# Patient Record
Sex: FEMALE | Race: WHITE | NOT HISPANIC OR LATINO | Employment: FULL TIME | URBAN - METROPOLITAN AREA
[De-identification: names, ages, dates, MRNs, and addresses within clinical notes are randomized per-mention and may not be internally consistent; named-entity substitution may affect disease eponyms.]

---

## 2012-10-18 LAB — EXTERNAL HIV SCREEN: NORMAL

## 2017-01-06 ENCOUNTER — GENERIC CONVERSION - ENCOUNTER (OUTPATIENT)
Dept: OTHER | Facility: OTHER | Age: 42
End: 2017-01-06

## 2017-01-31 ENCOUNTER — ALLSCRIPTS OFFICE VISIT (OUTPATIENT)
Dept: OTHER | Facility: OTHER | Age: 42
End: 2017-01-31

## 2017-02-03 LAB
DEPRECATED RDW RBC AUTO: 13.8 % (ref 11–15)
HCT VFR BLD AUTO: 42.6 % (ref 35–45)
HGB BLD-MCNC: 14.1 G/DL (ref 11.7–15.5)
LYME IGG/IGM AB (HISTORICAL): NORMAL INDEX
MCH RBC QN AUTO: 30 PG (ref 27–33)
MCHC RBC AUTO-ENTMCNC: 33.2 G/DL (ref 32–36)
MCV RBC AUTO: 90.2 FL (ref 80–100)
PLATELET # BLD AUTO: 185 THOUSAND/UL (ref 140–400)
PMV BLD AUTO: 10.2 FL (ref 7.5–12.5)
RBC # BLD AUTO: 4.72 MILLION/UL (ref 3.8–5.1)
T4 FREE SERPL-MCNC: 1.3 NG/DL (ref 0.8–2.7)
T4 TOTAL (HISTORICAL): 6 MCG/DL (ref 4.8–10.4)
THYROID MICROSOMAL ANTIBODY (HISTORICAL): 3 IU/ML
TSH SERPL DL<=0.05 MIU/L-ACNC: 1.59 MIU/L
WBC # BLD AUTO: 5.3 THOUSAND/UL (ref 3.8–10.8)

## 2017-02-06 ENCOUNTER — GENERIC CONVERSION - ENCOUNTER (OUTPATIENT)
Dept: OTHER | Facility: OTHER | Age: 42
End: 2017-02-06

## 2017-03-02 ENCOUNTER — ALLSCRIPTS OFFICE VISIT (OUTPATIENT)
Dept: OTHER | Facility: OTHER | Age: 42
End: 2017-03-02

## 2017-04-04 ENCOUNTER — ALLSCRIPTS OFFICE VISIT (OUTPATIENT)
Dept: OTHER | Facility: OTHER | Age: 42
End: 2017-04-04

## 2017-07-12 ENCOUNTER — ALLSCRIPTS OFFICE VISIT (OUTPATIENT)
Dept: OTHER | Facility: OTHER | Age: 42
End: 2017-07-12

## 2018-01-12 VITALS
BODY MASS INDEX: 24.46 KG/M2 | HEART RATE: 71 BPM | WEIGHT: 152.19 LBS | HEIGHT: 66 IN | TEMPERATURE: 97.8 F | RESPIRATION RATE: 16 BRPM | OXYGEN SATURATION: 98 % | DIASTOLIC BLOOD PRESSURE: 60 MMHG | SYSTOLIC BLOOD PRESSURE: 102 MMHG

## 2018-01-12 VITALS
DIASTOLIC BLOOD PRESSURE: 72 MMHG | OXYGEN SATURATION: 98 % | HEART RATE: 80 BPM | BODY MASS INDEX: 25.09 KG/M2 | HEIGHT: 66 IN | RESPIRATION RATE: 16 BRPM | WEIGHT: 156.13 LBS | SYSTOLIC BLOOD PRESSURE: 120 MMHG | TEMPERATURE: 98.4 F

## 2018-01-13 VITALS
HEART RATE: 91 BPM | BODY MASS INDEX: 25.12 KG/M2 | RESPIRATION RATE: 15 BRPM | OXYGEN SATURATION: 98 % | DIASTOLIC BLOOD PRESSURE: 70 MMHG | TEMPERATURE: 98.4 F | SYSTOLIC BLOOD PRESSURE: 112 MMHG | HEIGHT: 66 IN | WEIGHT: 156.31 LBS

## 2018-01-14 VITALS
BODY MASS INDEX: 24.96 KG/M2 | OXYGEN SATURATION: 98 % | DIASTOLIC BLOOD PRESSURE: 70 MMHG | RESPIRATION RATE: 16 BRPM | HEART RATE: 85 BPM | WEIGHT: 155.31 LBS | TEMPERATURE: 97.4 F | HEIGHT: 66 IN | SYSTOLIC BLOOD PRESSURE: 122 MMHG

## 2018-01-16 NOTE — RESULT NOTES
Verified Results  (Q) CBC (H/H, RBC, INDICES, WBC, PLT) [id number] 12:00AM [facility]     Test Name Result Flag Reference   WHITE BLOOD CELL COUNT 5 3 Thousand/uL  3 8-10 8   RED BLOOD CELL COUNT 4 72 Million/uL  3 80-5 10   HEMOGLOBIN 14 1 g/dL  11 7-15 5   HEMATOCRIT 42 6 %  35 0-45 0   MCV 90 2 fL  80 0-100 0   MCH 30 0 pg  27 0-33 0   MCHC 33 2 g/dL  32 0-36 0   RDW 13 8 %  11 0-15 0   PLATELET COUNT 797 Thousand/uL  140-400   MPV 10 2 fL  7 5-12 5     (Q) TSH, 3RD GENERATION [id number] 12:00AM [facility]     Test Name Result Flag Reference   TSH 1 59 mIU/L     Reference Range                         > or = 20 Years  0 40-4 50                              Pregnancy Ranges            First trimester    0 26-2 66            Second trimester   0 55-2 73            Third trimester    0 43-2 91     (Q) T4, FREE, DIRECT DIALYSIS AND T4, TOTAL [id number] 12:00AM [facility]     Test Name Result Flag Reference   T4, FREE, DIRECT DIALYSIS 1 3 ng/dL  0 8-2 7   Pregnancy Reference Ranges for T4, Free, Direct   Dialysis:     First Trimester:   0 9-2 0 ng/dL  Second Trimester:  0 8-1 5 ng/dL  Third Trimester:   0 8-1 7 ng/dL     This test was developed and its analytical performance  characteristics have been determined by [facility]  It has not been  cleared or approved by FDA  This assay has been validated  pursuant to the CLIA regulations and is used for clinical  purposes     T4, TOTAL 6 0 mcg/dL  4 8-10 4   <1 month: Not Established       1-23 months: 6 0-13 2 mcg/dL        2-12 years: 5 5-12 1 mcg/dL       13-20 years: 5 5-11 1 mcg/dL         >20 years: 4 8-10 4 mcg/dL       Pregnancy     1st Trimester: 6 4-15 2 mcg/dL     2nd Trimester: 7 4-15 2 mcg/dL     3rd Trimester: 7 7-13 8 mcg/dL     All Trimesters          Together: 7 0-14 7 mcg/dL     Conversion factor: 1 mcg/dL = 12 9 nmol/L     (Q) THYROID PEROXIDASE ANTIBODIES [id number] 12:00AM [facility] Test Name Result Flag Reference   THYROID PEROXIDASE$ANTIBODIES 3 IU/mL  <9     (Q) LYME DISEASE AB, TOTAL W/REFL WB (IGG, IGM) [id number] 12:00AM [facility]     Test Name Result Flag Reference   LYME AB SCREEN < OR = 0 90 index     Index                 Interpretation                     -----                 --------------                     < or = 0 90           Negative                     0  91-1 09             Equivocal                     > or = 1 10           Positive     The use of purified VlsE-1 and PepC10 antigens in this  assay provides improved specificity compared to assays  that utilize whole cell lysates of B  burgdorferi, the  causative agent of Lyme disease, and slightly better  sensitivity compared to the C6 antibody assay  As recommended by the Food and Drug   Administration (FDA), all samples with positive or   equivocal results in a Borrelia burgdorferi antibody    EIA (screening) will be tested using a blot method  Positive or equivocal screening test results should not   be interpreted as truly positive until verified as such   using a supplemental assay (e g , B  burgdorferi blot)  The screening test and/or blot for B  burgdorferi   antibodies may be falsely negative in early stages of   Lyme disease, including the period when erythema   migrans is apparent

## 2018-12-03 RX ORDER — ELETRIPTAN HYDROBROMIDE 40 MG/1
TABLET, FILM COATED ORAL
Qty: 6 TABLET | Refills: 0 | OUTPATIENT
Start: 2018-12-03

## 2018-12-03 NOTE — TELEPHONE ENCOUNTER
Received auto request for relpax  We have not seen pt in a year and a half  She needs appt for any medication refills  relpax denied since we havent see her and her records are not updated  Please schedule   thanks

## 2018-12-04 NOTE — TELEPHONE ENCOUNTER
I called and left a message for Jarod Kenney consent ok on her cell phone per old EHR advising pt to please call the office to schedule an appointment we received an automatic refill request and it was denied since it has been over a year and a half since pt has been seen no refills until has an appointment pt is to please call the office to schedule

## 2019-10-29 ENCOUNTER — OFFICE VISIT (OUTPATIENT)
Dept: FAMILY MEDICINE CLINIC | Facility: CLINIC | Age: 44
End: 2019-10-29
Payer: COMMERCIAL

## 2019-10-29 VITALS
HEIGHT: 66 IN | WEIGHT: 153 LBS | BODY MASS INDEX: 24.59 KG/M2 | HEART RATE: 63 BPM | DIASTOLIC BLOOD PRESSURE: 84 MMHG | SYSTOLIC BLOOD PRESSURE: 118 MMHG | OXYGEN SATURATION: 98 % | TEMPERATURE: 97.8 F

## 2019-10-29 DIAGNOSIS — R51.9 CHRONIC NONINTRACTABLE HEADACHE, UNSPECIFIED HEADACHE TYPE: ICD-10-CM

## 2019-10-29 DIAGNOSIS — H81.13 BENIGN PAROXYSMAL POSITIONAL VERTIGO DUE TO BILATERAL VESTIBULAR DISORDER: Primary | ICD-10-CM

## 2019-10-29 DIAGNOSIS — G89.29 CHRONIC NONINTRACTABLE HEADACHE, UNSPECIFIED HEADACHE TYPE: ICD-10-CM

## 2019-10-29 PROBLEM — H81.10 BENIGN POSITIONAL VERTIGO: Status: ACTIVE | Noted: 2017-07-12

## 2019-10-29 PROCEDURE — 3008F BODY MASS INDEX DOCD: CPT | Performed by: FAMILY MEDICINE

## 2019-10-29 PROCEDURE — 99214 OFFICE O/P EST MOD 30 MIN: CPT | Performed by: FAMILY MEDICINE

## 2019-10-29 RX ORDER — ELETRIPTAN HYDROBROMIDE 40 MG/1
1 TABLET, FILM COATED ORAL DAILY PRN
COMMUNITY
Start: 2013-03-16 | End: 2019-10-29 | Stop reason: SDUPTHER

## 2019-10-29 RX ORDER — DULOXETIN HYDROCHLORIDE 30 MG/1
1 CAPSULE, DELAYED RELEASE ORAL DAILY
COMMUNITY
Start: 2017-07-12 | End: 2020-06-01 | Stop reason: ALTCHOICE

## 2019-10-29 RX ORDER — ELETRIPTAN HYDROBROMIDE 40 MG/1
40 TABLET, FILM COATED ORAL DAILY PRN
Qty: 8 TABLET | Refills: 2 | Status: SHIPPED | OUTPATIENT
Start: 2019-10-29 | End: 2020-11-10 | Stop reason: SDUPTHER

## 2019-10-29 RX ORDER — FLUTICASONE PROPIONATE 50 MCG
2 SPRAY, SUSPENSION (ML) NASAL DAILY
COMMUNITY
Start: 2017-04-04 | End: 2020-06-01 | Stop reason: ALTCHOICE

## 2019-10-29 RX ORDER — MECLIZINE HYDROCHLORIDE 25 MG/1
1 TABLET ORAL 3 TIMES DAILY PRN
COMMUNITY
Start: 2017-04-04 | End: 2019-10-29 | Stop reason: SDUPTHER

## 2019-10-29 RX ORDER — MECLIZINE HYDROCHLORIDE 25 MG/1
25 TABLET ORAL 3 TIMES DAILY PRN
Qty: 30 TABLET | Refills: 2 | Status: SHIPPED | OUTPATIENT
Start: 2019-10-29 | End: 2020-06-01 | Stop reason: ALTCHOICE

## 2019-10-29 NOTE — PROGRESS NOTES
Assessment/Plan:    1  Benign paroxysmal positional vertigo due to bilateral vestibular disorder  Reviewed patient's symptoms today  At this time, her symptoms appear likely secondary to possible benign proximal positional vertigo  Will restart her meclizine for further symptom relief  She may benefit from use of the Epley maneuver  Follow up if any symptoms worsening   - meclizine (ANTIVERT) 25 mg tablet; Take 1 tablet (25 mg total) by mouth 3 (three) times a day as needed for dizziness  Dispense: 30 tablet; Refill: 2  - Ambulatory referral to Neurology; Future    2  Chronic nonintractable headache, unspecified headache type  Patient appears to have had chronic migraine headaches  She was prescribed Relpax in the past which was largely effective for her  Will restart this medication  Given her previous poor control with her migraine headaches, will refer patient to Neurology for further evaluation and treatment  - eletriptan (RELPAX) 40 MG tablet; Take 1 tablet (40 mg total) by mouth daily as needed for migraine  Dispense: 8 tablet; Refill: 2  - Ambulatory referral to Neurology; Future     There are no diagnoses linked to this encounter  Subjective:    Chief Complaint   Patient presents with    Dizziness     started about 3 days ago         Patient ID: Dexter Campa is a 37 y o  female  Dizziness   This is a recurrent problem  The current episode started in the past 7 days  The problem occurs intermittently  The problem has been unchanged  Associated symptoms include vertigo  Pertinent negatives include no abdominal pain, arthralgias, chest pain, chills, congestion, coughing, fatigue, fever, headaches, myalgias, nausea, neck pain, numbness, rash, sore throat or visual change  Associated symptoms comments: Ear ache  Treatments tried: excedrin  The treatment provided no relief  Review of Systems   Constitutional: Negative for activity change, chills, fatigue and fever     HENT: Negative for congestion, ear pain, sinus pressure and sore throat  Eyes: Negative for redness, itching and visual disturbance  Respiratory: Negative for cough and shortness of breath  Cardiovascular: Negative for chest pain and palpitations  Gastrointestinal: Negative for abdominal pain, diarrhea and nausea  Endocrine: Negative for cold intolerance and heat intolerance  Genitourinary: Positive for dyspareunia  Negative for dysuria, flank pain and frequency  Musculoskeletal: Negative for arthralgias, back pain, gait problem, myalgias and neck pain  Skin: Negative for color change and rash  Allergic/Immunologic: Negative for environmental allergies  Neurological: Positive for dizziness and vertigo  Negative for numbness and headaches  Psychiatric/Behavioral: Negative for behavioral problems and sleep disturbance  The following portions of the patient's history were reviewed and updated as appropriate : past family history, past medical history, past social history and past surgical history        Current Outpatient Medications:     levonorgestrel (MIRENA) 20 MCG/24HR IUD, 1 each by Intrauterine route, Disp: , Rfl:     DULoxetine (CYMBALTA) 30 mg delayed release capsule, Take 1 capsule by mouth daily, Disp: , Rfl:     eletriptan (RELPAX) 40 MG tablet, Take 1 tablet by mouth daily as needed, Disp: , Rfl:     fluticasone (FLONASE) 50 mcg/act nasal spray, 2 sprays into each nostril daily, Disp: , Rfl:     meclizine (ANTIVERT) 25 mg tablet, Take 1 tablet by mouth 3 (three) times a day as needed, Disp: , Rfl:     Multiple Vitamins-Minerals (MULTIVITAMIN ADULT PO), Take 1 tablet by mouth daily, Disp: , Rfl:     Objective:    Vitals:    10/29/19 1219   BP: 118/84   BP Location: Left arm   Patient Position: Sitting   Cuff Size: Adult   Pulse: 63   Temp: 97 8 °F (36 6 °C)   TempSrc: Tympanic   SpO2: 98%   Weight: 69 4 kg (153 lb)   Height: 5' 6" (1 676 m)        Physical Exam   Constitutional: She is oriented to person, place, and time  She appears well-developed and well-nourished  HENT:   Head: Normocephalic and atraumatic  Nose: Nose normal    Mouth/Throat: No oropharyngeal exudate  Eyes: Pupils are equal, round, and reactive to light  Right eye exhibits no discharge  Left eye exhibits no discharge  Neck: Normal range of motion  Neck supple  No tracheal deviation present  Cardiovascular: Normal rate, regular rhythm and intact distal pulses  Exam reveals no gallop and no friction rub  No murmur heard  Pulses:       Dorsalis pedis pulses are 2+ on the right side, and 2+ on the left side  Posterior tibial pulses are 2+ on the right side, and 2+ on the left side  Pulmonary/Chest: Effort normal and breath sounds normal  No respiratory distress  She has no wheezes  She has no rales  Abdominal: Soft  Bowel sounds are normal  She exhibits no distension  There is no tenderness  There is no rebound and no guarding  Musculoskeletal: Normal range of motion  She exhibits no edema  Lymphadenopathy:        Head (right side): No submental and no submandibular adenopathy present  Head (left side): No submental and no submandibular adenopathy present  She has no cervical adenopathy  Right cervical: No superficial cervical, no deep cervical and no posterior cervical adenopathy present  Left cervical: No superficial cervical, no deep cervical and no posterior cervical adenopathy present  Neurological: She is alert and oriented to person, place, and time  No cranial nerve deficit or sensory deficit  Skin: Skin is warm, dry and intact  Psychiatric: Her speech is normal and behavior is normal  Judgment normal  Her mood appears not anxious  Cognition and memory are normal  She does not exhibit a depressed mood  Vitals reviewed

## 2020-04-27 ENCOUNTER — TELEPHONE (OUTPATIENT)
Dept: FAMILY MEDICINE CLINIC | Facility: CLINIC | Age: 45
End: 2020-04-27

## 2020-06-01 ENCOUNTER — TELEPHONE (OUTPATIENT)
Dept: ADMINISTRATIVE | Facility: OTHER | Age: 45
End: 2020-06-01

## 2020-06-01 ENCOUNTER — OFFICE VISIT (OUTPATIENT)
Dept: FAMILY MEDICINE CLINIC | Facility: CLINIC | Age: 45
End: 2020-06-01
Payer: COMMERCIAL

## 2020-06-01 VITALS
HEART RATE: 82 BPM | DIASTOLIC BLOOD PRESSURE: 94 MMHG | TEMPERATURE: 97.8 F | BODY MASS INDEX: 28.29 KG/M2 | HEIGHT: 65 IN | WEIGHT: 169.8 LBS | SYSTOLIC BLOOD PRESSURE: 120 MMHG | OXYGEN SATURATION: 98 %

## 2020-06-01 DIAGNOSIS — Z13.29 SCREENING FOR THYROID DISORDER: ICD-10-CM

## 2020-06-01 DIAGNOSIS — Z13.1 SCREENING FOR DIABETES MELLITUS: ICD-10-CM

## 2020-06-01 DIAGNOSIS — Z13.21 ENCOUNTER FOR VITAMIN DEFICIENCY SCREENING: ICD-10-CM

## 2020-06-01 DIAGNOSIS — Z13.220 SCREENING FOR LIPID DISORDERS: ICD-10-CM

## 2020-06-01 DIAGNOSIS — Z13.6 SCREENING FOR CARDIOVASCULAR CONDITION: ICD-10-CM

## 2020-06-01 DIAGNOSIS — F41.8 DEPRESSION WITH ANXIETY: Primary | ICD-10-CM

## 2020-06-01 PROCEDURE — 3008F BODY MASS INDEX DOCD: CPT | Performed by: NURSE PRACTITIONER

## 2020-06-01 PROCEDURE — 1036F TOBACCO NON-USER: CPT | Performed by: NURSE PRACTITIONER

## 2020-06-01 PROCEDURE — 99214 OFFICE O/P EST MOD 30 MIN: CPT | Performed by: NURSE PRACTITIONER

## 2020-06-01 RX ORDER — ALPRAZOLAM 0.25 MG/1
0.25 TABLET ORAL
Qty: 15 TABLET | Refills: 0 | Status: SHIPPED | OUTPATIENT
Start: 2020-06-01 | End: 2020-07-31 | Stop reason: SDUPTHER

## 2020-06-20 LAB
25(OH)D3 SERPL-MCNC: 27 NG/ML (ref 30–100)
ALBUMIN SERPL-MCNC: 4.5 G/DL (ref 3.6–5.1)
ALBUMIN/GLOB SERPL: 1.5 (CALC) (ref 1–2.5)
ALP SERPL-CCNC: 56 U/L (ref 31–125)
ALT SERPL-CCNC: 17 U/L (ref 6–29)
AST SERPL-CCNC: 18 U/L (ref 10–30)
BASOPHILS # BLD AUTO: 72 CELLS/UL (ref 0–200)
BASOPHILS NFR BLD AUTO: 1.3 %
BILIRUB SERPL-MCNC: 0.6 MG/DL (ref 0.2–1.2)
BUN SERPL-MCNC: 12 MG/DL (ref 7–25)
BUN/CREAT SERPL: NORMAL (CALC) (ref 6–22)
CALCIUM SERPL-MCNC: 9.4 MG/DL (ref 8.6–10.2)
CHLORIDE SERPL-SCNC: 105 MMOL/L (ref 98–110)
CHOLEST SERPL-MCNC: 190 MG/DL
CHOLEST/HDLC SERPL: 3.3 (CALC)
CO2 SERPL-SCNC: 25 MMOL/L (ref 20–32)
CREAT SERPL-MCNC: 0.82 MG/DL (ref 0.5–1.1)
EOSINOPHIL # BLD AUTO: 182 CELLS/UL (ref 15–500)
EOSINOPHIL NFR BLD AUTO: 3.3 %
ERYTHROCYTE [DISTWIDTH] IN BLOOD BY AUTOMATED COUNT: 12.2 % (ref 11–15)
GLOBULIN SER CALC-MCNC: 3 G/DL (CALC) (ref 1.9–3.7)
GLUCOSE SERPL-MCNC: 93 MG/DL (ref 65–99)
HCT VFR BLD AUTO: 44.9 % (ref 35–45)
HDLC SERPL-MCNC: 57 MG/DL
HGB BLD-MCNC: 14.7 G/DL (ref 11.7–15.5)
LDLC SERPL CALC-MCNC: 115 MG/DL (CALC)
LYMPHOCYTES # BLD AUTO: 2112 CELLS/UL (ref 850–3900)
LYMPHOCYTES NFR BLD AUTO: 38.4 %
MCH RBC QN AUTO: 30.4 PG (ref 27–33)
MCHC RBC AUTO-ENTMCNC: 32.7 G/DL (ref 32–36)
MCV RBC AUTO: 92.8 FL (ref 80–100)
MONOCYTES # BLD AUTO: 451 CELLS/UL (ref 200–950)
MONOCYTES NFR BLD AUTO: 8.2 %
NEUTROPHILS # BLD AUTO: 2684 CELLS/UL (ref 1500–7800)
NEUTROPHILS NFR BLD AUTO: 48.8 %
NONHDLC SERPL-MCNC: 133 MG/DL (CALC)
PLATELET # BLD AUTO: 220 THOUSAND/UL (ref 140–400)
PMV BLD REES-ECKER: 11.7 FL (ref 7.5–12.5)
POTASSIUM SERPL-SCNC: 4.1 MMOL/L (ref 3.5–5.3)
PROT SERPL-MCNC: 7.5 G/DL (ref 6.1–8.1)
RBC # BLD AUTO: 4.84 MILLION/UL (ref 3.8–5.1)
SL AMB EGFR AFRICAN AMERICAN: 101 ML/MIN/1.73M2
SL AMB EGFR NON AFRICAN AMERICAN: 87 ML/MIN/1.73M2
SODIUM SERPL-SCNC: 138 MMOL/L (ref 135–146)
TRIGL SERPL-MCNC: 84 MG/DL
TSH SERPL-ACNC: 1.23 MIU/L
WBC # BLD AUTO: 5.5 THOUSAND/UL (ref 3.8–10.8)

## 2020-06-24 ENCOUNTER — TELEPHONE (OUTPATIENT)
Dept: PSYCHIATRY | Facility: CLINIC | Age: 45
End: 2020-06-24

## 2020-06-30 ENCOUNTER — OFFICE VISIT (OUTPATIENT)
Dept: FAMILY MEDICINE CLINIC | Facility: CLINIC | Age: 45
End: 2020-06-30
Payer: COMMERCIAL

## 2020-06-30 ENCOUNTER — TELEPHONE (OUTPATIENT)
Dept: FAMILY MEDICINE CLINIC | Facility: CLINIC | Age: 45
End: 2020-06-30

## 2020-06-30 VITALS
SYSTOLIC BLOOD PRESSURE: 124 MMHG | BODY MASS INDEX: 28.68 KG/M2 | HEART RATE: 66 BPM | TEMPERATURE: 98.7 F | WEIGHT: 168 LBS | OXYGEN SATURATION: 99 % | HEIGHT: 64 IN | DIASTOLIC BLOOD PRESSURE: 84 MMHG

## 2020-06-30 DIAGNOSIS — F41.8 DEPRESSION WITH ANXIETY: Primary | ICD-10-CM

## 2020-06-30 PROCEDURE — 1036F TOBACCO NON-USER: CPT | Performed by: NURSE PRACTITIONER

## 2020-06-30 PROCEDURE — 99213 OFFICE O/P EST LOW 20 MIN: CPT | Performed by: NURSE PRACTITIONER

## 2020-06-30 PROCEDURE — 3008F BODY MASS INDEX DOCD: CPT | Performed by: NURSE PRACTITIONER

## 2020-07-13 ENCOUNTER — TELEPHONE (OUTPATIENT)
Dept: PSYCHIATRY | Facility: CLINIC | Age: 45
End: 2020-07-13

## 2020-07-31 ENCOUNTER — OFFICE VISIT (OUTPATIENT)
Dept: FAMILY MEDICINE CLINIC | Facility: CLINIC | Age: 45
End: 2020-07-31
Payer: COMMERCIAL

## 2020-07-31 ENCOUNTER — TELEPHONE (OUTPATIENT)
Dept: ADMINISTRATIVE | Facility: OTHER | Age: 45
End: 2020-07-31

## 2020-07-31 VITALS
BODY MASS INDEX: 28.54 KG/M2 | HEIGHT: 64 IN | WEIGHT: 167.2 LBS | OXYGEN SATURATION: 98 % | TEMPERATURE: 97.4 F | DIASTOLIC BLOOD PRESSURE: 80 MMHG | SYSTOLIC BLOOD PRESSURE: 120 MMHG | HEART RATE: 68 BPM

## 2020-07-31 DIAGNOSIS — F41.8 DEPRESSION WITH ANXIETY: Primary | ICD-10-CM

## 2020-07-31 PROCEDURE — 99213 OFFICE O/P EST LOW 20 MIN: CPT | Performed by: NURSE PRACTITIONER

## 2020-07-31 PROCEDURE — 3008F BODY MASS INDEX DOCD: CPT | Performed by: NURSE PRACTITIONER

## 2020-07-31 PROCEDURE — 1036F TOBACCO NON-USER: CPT | Performed by: NURSE PRACTITIONER

## 2020-07-31 RX ORDER — ALPRAZOLAM 0.25 MG/1
0.25 TABLET ORAL
Qty: 15 TABLET | Refills: 0 | Status: SHIPPED | OUTPATIENT
Start: 2020-07-31 | End: 2020-11-10 | Stop reason: SDUPTHER

## 2020-07-31 RX ORDER — OMEGA-3S/DHA/EPA/FISH OIL/D3 300MG-1000
2000 CAPSULE ORAL DAILY
COMMUNITY

## 2020-07-31 RX ORDER — BUPROPION HYDROCHLORIDE 150 MG/1
150 TABLET, EXTENDED RELEASE ORAL 2 TIMES DAILY
Qty: 60 TABLET | Refills: 1 | Status: SHIPPED | OUTPATIENT
Start: 2020-07-31 | End: 2020-09-07 | Stop reason: DRUGHIGH

## 2020-07-31 RX ORDER — CALCIUM CARBONATE 300MG(750)
400 TABLET,CHEWABLE ORAL
COMMUNITY

## 2020-07-31 NOTE — PROGRESS NOTES
[facility]    ASSESSMENT AND PLAN     1  Depression with anxiety  Discussed symptoms and options at length today  Will trial switching treatment to Wellbutrin  Wean sertraline  Plan as follows:  Sertraline 50 x 3 days, 25 x 3 days then stop  Start Wellbutrin at 150 tonight  Take at HS only for 1st 7 days  Increase to 150 b i d  Once sertraline completed  Small dose Xanax refilled  PMED verified with no red flags  Recommend taking melatonin consistently  Patient to contact office with any problems or concerns during transition  She will let me know how she is doing in 2-3 weeks  Recheck in office 4-6 weeks  NOTE:  She is doing wellness program through her employer  Referral still in for behavioral therapy  Patient has yet to schedule  Still considering    - buPROPion (WELLBUTRIN SR) 150 mg 12 hr tablet; Take 1 tablet (150 mg total) by mouth 2 (two) times a day  Dispense: 60 tablet; Refill: 1  - ALPRAZolam (XANAX) 0 25 mg tablet; Take 1 tablet (0 25 mg total) by mouth daily at bedtime as needed for anxiety  Dispense: 15 tablet; Refill: 0            SUBJECTIVE       Patient ID: Perla Kate is a 40 y o  female  Chief Complaint   Patient presents with    Follow-up     Depression and anxiety- does not feel any difference with increase in Sertraline  HISTORY OF PRESENT ILLNESS    Patient presents today for follow-up  Increased sertraline to 75 at last visit 4 weeks ago  She did note initial improvement when 1st starting sertraline, but has not noticed any further improvement since increasing dose from 50-75  She still has Xanax left from her 15 tab dose  She only uses it very infrequently when she has an actual panic or anxiety attack  She is still having difficulty sleeping and shutting her brain down at night  She has started melatonin, but she has not been taking it consistently  Her mom has been suffering with depression and anxiety the last few months as well  States she was started on Wellbutrin and both her mom and she notices significant improvement in her mom symptoms  The following portions of the patient's history were reviewed and updated as appropriate: allergies, current medications, past family history, past medical history, past social history, past surgical history and problem list     REVIEW OF SYSTEMS  Review of Systems   Psychiatric/Behavioral: Positive for decreased concentration and sleep disturbance  Negative for self-injury and suicidal ideas  The patient is nervous/anxious  OBJECTIVE      VITAL SIGNS  /80 (BP Location: Left arm, Patient Position: Sitting, Cuff Size: Adult)   Pulse 68   Temp (!) 97 4 °F (36 3 °C)   Ht 5' 4 17" (1 63 m)   Wt 75 8 kg (167 lb 3 2 oz)   SpO2 98%   BMI 28 55 kg/m²     CURRENT MEDICATIONS    Current Outpatient Medications:     ALPRAZolam (XANAX) 0 25 mg tablet, Take 1 tablet (0 25 mg total) by mouth daily at bedtime as needed for anxiety, Disp: 15 tablet, Rfl: 0    Calcium Carbonate-Vitamin D (Calcium 600+D) 600-400 MG-UNIT per tablet, Take 1 tablet by mouth daily, Disp: , Rfl:     cholecalciferol (VITAMIN D3) 400 units tablet, Take 2,000 Units by mouth daily, Disp: , Rfl:     eletriptan (RELPAX) 40 MG tablet, Take 1 tablet (40 mg total) by mouth daily as needed for migraine, Disp: 8 tablet, Rfl: 2    levonorgestrel (MIRENA) 20 MCG/24HR IUD, 1 each by Intrauterine route, Disp: , Rfl:     Magnesium 400 MG TABS, Take 400 mg by mouth, Disp: , Rfl:     Multiple Vitamins-Minerals (MULTIVITAMIN ADULT PO), Take 1 tablet by mouth daily, Disp: , Rfl:     buPROPion (WELLBUTRIN SR) 150 mg 12 hr tablet, Take 1 tablet (150 mg total) by mouth 2 (two) times a day, Disp: 60 tablet, Rfl: 1      PHYSICAL EXAMINATION   Physical Exam   Constitutional: She is oriented to person, place, and time  She appears well-developed and well-nourished  She does not appear ill  No distress     Neurological: She is alert and oriented to person, place, and time  Psychiatric: She has a normal mood and affect  Her speech is normal and behavior is normal    Nursing note and vitals reviewed

## 2020-07-31 NOTE — TELEPHONE ENCOUNTER
Upon review of the In Basket request we were able to locate, review, and update the patient chart as requested for HIV  Any additional questions or concerns should be emailed to the Practice Liaisons via [email or web address] email, please do not reply via In Basket      Thank you  Nitin Perez

## 2020-07-31 NOTE — TELEPHONE ENCOUNTER
----- Message from Elizabeth Sherwood MA sent at 7/31/2020  9:46 AM EDT -----  Regarding: [facility]- HIV testing  07/31/20 9:47 AM    Hello, our patient Angelic Shah has had HIV completed/performed  Please assist in updating the patient chart by pulling the Care Everywhere (CE) document  The date of service is 10/18/12       Thank you,  Elizabeth Sherwood MA  [facility]

## 2020-09-04 ENCOUNTER — TELEPHONE (OUTPATIENT)
Dept: FAMILY MEDICINE CLINIC | Facility: CLINIC | Age: 45
End: 2020-09-04

## 2020-09-04 NOTE — TELEPHONE ENCOUNTER
Pt called inquiring about Wellbutrin increase to 450 mg per pt messages from 9/2 and 8/27/20  Pharmacy is [facility] please advise  Will f/u w/ pt w/ info

## 2020-09-07 DIAGNOSIS — F32.A DEPRESSION, UNSPECIFIED DEPRESSION TYPE: Primary | ICD-10-CM

## 2020-09-07 DIAGNOSIS — F41.8 DEPRESSION WITH ANXIETY: ICD-10-CM

## 2020-09-07 RX ORDER — BUPROPION HYDROCHLORIDE 200 MG/1
200 TABLET, EXTENDED RELEASE ORAL 2 TIMES DAILY
Qty: 180 TABLET | Refills: 0 | Status: SHIPPED | OUTPATIENT
Start: 2020-09-07 | End: 2020-11-10 | Stop reason: SDUPTHER

## 2020-11-10 ENCOUNTER — OFFICE VISIT (OUTPATIENT)
Dept: FAMILY MEDICINE CLINIC | Facility: CLINIC | Age: 45
End: 2020-11-10
Payer: COMMERCIAL

## 2020-11-10 VITALS
WEIGHT: 168 LBS | BODY MASS INDEX: 27.99 KG/M2 | DIASTOLIC BLOOD PRESSURE: 90 MMHG | OXYGEN SATURATION: 99 % | HEART RATE: 94 BPM | SYSTOLIC BLOOD PRESSURE: 120 MMHG | HEIGHT: 65 IN | TEMPERATURE: 98.2 F

## 2020-11-10 DIAGNOSIS — G89.29 CHRONIC NONINTRACTABLE HEADACHE, UNSPECIFIED HEADACHE TYPE: ICD-10-CM

## 2020-11-10 DIAGNOSIS — R51.9 CHRONIC NONINTRACTABLE HEADACHE, UNSPECIFIED HEADACHE TYPE: ICD-10-CM

## 2020-11-10 DIAGNOSIS — Z23 NEED FOR TDAP VACCINATION: ICD-10-CM

## 2020-11-10 DIAGNOSIS — F41.8 DEPRESSION WITH ANXIETY: Primary | ICD-10-CM

## 2020-11-10 PROCEDURE — 3008F BODY MASS INDEX DOCD: CPT | Performed by: NURSE PRACTITIONER

## 2020-11-10 PROCEDURE — 1036F TOBACCO NON-USER: CPT | Performed by: NURSE PRACTITIONER

## 2020-11-10 PROCEDURE — 3725F SCREEN DEPRESSION PERFORMED: CPT | Performed by: NURSE PRACTITIONER

## 2020-11-10 PROCEDURE — 90471 IMMUNIZATION ADMIN: CPT | Performed by: NURSE PRACTITIONER

## 2020-11-10 PROCEDURE — 90715 TDAP VACCINE 7 YRS/> IM: CPT | Performed by: NURSE PRACTITIONER

## 2020-11-10 PROCEDURE — 99214 OFFICE O/P EST MOD 30 MIN: CPT | Performed by: NURSE PRACTITIONER

## 2020-11-10 RX ORDER — ELETRIPTAN HYDROBROMIDE 40 MG/1
40 TABLET, FILM COATED ORAL DAILY PRN
Qty: 8 TABLET | Refills: 1 | Status: SHIPPED | OUTPATIENT
Start: 2020-11-10 | End: 2020-11-10 | Stop reason: SDUPTHER

## 2020-11-10 RX ORDER — ALPRAZOLAM 0.25 MG/1
0.25 TABLET ORAL
Qty: 15 TABLET | Refills: 0 | Status: SHIPPED | OUTPATIENT
Start: 2020-11-10 | End: 2021-06-16 | Stop reason: SDUPTHER

## 2020-11-10 RX ORDER — ELETRIPTAN HYDROBROMIDE 40 MG/1
40 TABLET, FILM COATED ORAL DAILY PRN
Qty: 8 TABLET | Refills: 1 | Status: SHIPPED | OUTPATIENT
Start: 2020-11-10 | End: 2021-06-09 | Stop reason: SDUPTHER

## 2020-11-10 RX ORDER — BUPROPION HYDROCHLORIDE 200 MG/1
200 TABLET, EXTENDED RELEASE ORAL 2 TIMES DAILY
Qty: 180 TABLET | Refills: 1 | Status: SHIPPED | OUTPATIENT
Start: 2020-11-10 | End: 2021-06-06

## 2021-05-12 LAB — HBA1C MFR BLD HPLC: 5.1 %

## 2021-06-05 DIAGNOSIS — F41.8 DEPRESSION WITH ANXIETY: ICD-10-CM

## 2021-06-06 RX ORDER — BUPROPION HYDROCHLORIDE 200 MG/1
TABLET, EXTENDED RELEASE ORAL
Qty: 60 TABLET | Refills: 0 | Status: SHIPPED | OUTPATIENT
Start: 2021-06-06 | End: 2021-06-09 | Stop reason: SDUPTHER

## 2021-06-07 NOTE — TELEPHONE ENCOUNTER
Please call patient  She is over due for 6 month checkup    Please allow 2 slots-mental health check

## 2021-06-09 ENCOUNTER — OFFICE VISIT (OUTPATIENT)
Dept: FAMILY MEDICINE CLINIC | Facility: CLINIC | Age: 46
End: 2021-06-09
Payer: COMMERCIAL

## 2021-06-09 VITALS
DIASTOLIC BLOOD PRESSURE: 96 MMHG | BODY MASS INDEX: 28.38 KG/M2 | HEART RATE: 81 BPM | SYSTOLIC BLOOD PRESSURE: 128 MMHG | TEMPERATURE: 98.6 F | HEIGHT: 64 IN | WEIGHT: 166.2 LBS | OXYGEN SATURATION: 99 %

## 2021-06-09 DIAGNOSIS — G43.911 INTRACTABLE MIGRAINE WITH STATUS MIGRAINOSUS, UNSPECIFIED MIGRAINE TYPE: ICD-10-CM

## 2021-06-09 DIAGNOSIS — E55.9 VITAMIN D DEFICIENCY: ICD-10-CM

## 2021-06-09 DIAGNOSIS — F41.8 DEPRESSION WITH ANXIETY: Primary | ICD-10-CM

## 2021-06-09 PROCEDURE — 3008F BODY MASS INDEX DOCD: CPT | Performed by: NURSE PRACTITIONER

## 2021-06-09 PROCEDURE — 99214 OFFICE O/P EST MOD 30 MIN: CPT | Performed by: NURSE PRACTITIONER

## 2021-06-09 PROCEDURE — 1036F TOBACCO NON-USER: CPT | Performed by: NURSE PRACTITIONER

## 2021-06-09 RX ORDER — BUPROPION HYDROCHLORIDE 200 MG/1
200 TABLET, EXTENDED RELEASE ORAL 2 TIMES DAILY
Qty: 180 TABLET | Refills: 1 | Status: SHIPPED | OUTPATIENT
Start: 2021-06-09 | End: 2022-01-09

## 2021-06-09 RX ORDER — ELETRIPTAN HYDROBROMIDE 40 MG/1
40 TABLET, FILM COATED ORAL DAILY PRN
Qty: 30 TABLET | Refills: 0 | Status: SHIPPED | OUTPATIENT
Start: 2021-06-09

## 2021-06-10 ENCOUNTER — TELEPHONE (OUTPATIENT)
Dept: ADMINISTRATIVE | Facility: OTHER | Age: 46
End: 2021-06-10

## 2021-06-10 NOTE — TELEPHONE ENCOUNTER
Upon review of the In Basket request we were able to locate, review, and update the patient chart as requested for Mammogram     Any additional questions or concerns should be emailed to the Practice Liaisons via [email or web address] email, please do not reply via In Basket      Thank you  Patricia Marcum

## 2021-06-10 NOTE — TELEPHONE ENCOUNTER
----- Message from Lori Torrez MA sent at 6/9/2021  2:30 PM EDT -----  Regarding: Care Gap Request  06/09/21 2:30 PM    Hello, our patient Dudley Hammonds has had Mammogram completed/performed  Please assist in updating the patient chart by pulling the Care Everywhere (CE) document  The date of service is 2/12/21       Thank you,  Lori Torrez MA  [facility]

## 2021-06-14 NOTE — PROGRESS NOTES
[facility]    ASSESSMENT AND PLAN     1  Depression with anxiety    Stable  Doing well on Wellbutrin 200 q  12  Compliant with exception of occasional missed evening dose  Anxiety well controlled with minimal Xanax use  Wellbutrin Rx refilled today  Monitor q 6 months  Annual physical at that time  Return sooner if needed    - buPROPion (WELLBUTRIN SR) 200 MG 12 hr tablet; Take 1 tablet (200 mg total) by mouth 2 (two) times a day  Dispense: 180 tablet; Refill: 1    2  Vitamin D deficiency   last vitamin-D in June 2020: 27  Continues with supplement  Will recheck today  Patient aware insurance may not cover    - Vitamin D 25 hydroxy; Future  - Vitamin D 25 hydroxy    3  Intractable migraine with status migrainosus, unspecified migraine type    Reviewed migraine history  Continues taking Relpax p r n  Requests referral to Neurology for evaluation and alternate treatment options  Placed today  - eletriptan (Relpax) 40 MG tablet; Take 1 tablet (40 mg total) by mouth daily as needed for migraine  Dispense: 30 tablet; Refill: 0  - Ambulatory referral to Neurology; Future      NOTE: reviewed recent labs obtained through work  WNL      SUBJECTIVE       Patient ID: Julio Weaver is a 39 y o  female  Chief Complaint   Patient presents with    Follow-up     Med Check for anxiety and depression       HISTORY OF PRESENT ILLNESS      Presents for routine checkup  Depression/ anxiety well controlled on Wellbutrin to 100 b i d    Infrequent Xanax use  Does complain of ongoing migraines  1-2 per month  Averaging 1-3 days in duration  The following portions of the patient's history were reviewed and updated as appropriate: allergies, current medications, past family history, past medical history, past social history, past surgical history and problem list     REVIEW OF SYSTEMS  Review of Systems   Constitutional: Negative  HENT: Negative  Respiratory: Negative      Cardiovascular: Negative  Gastrointestinal: Negative  Genitourinary: Negative  Neurological: Positive for headaches (chronic)  Psychiatric/Behavioral: Negative  Negative for self-injury, sleep disturbance and suicidal ideas  The patient is not nervous/anxious  OBJECTIVE      VITAL SIGNS  /96 (BP Location: Right arm, Patient Position: Sitting, Cuff Size: Adult)   Pulse 81   Temp 98 6 °F (37 °C)   Ht 5' 4" (1 626 m)   Wt 75 4 kg (166 lb 3 2 oz)   LMP  (LMP Unknown)   SpO2 99%   BMI 28 53 kg/m²   128/90    CURRENT MEDICATIONS    Current Outpatient Medications:     ALPRAZolam (XANAX) 0 25 mg tablet, Take 1 tablet (0 25 mg total) by mouth daily at bedtime as needed for anxiety, Disp: 15 tablet, Rfl: 0    buPROPion (WELLBUTRIN SR) 200 MG 12 hr tablet, Take 1 tablet (200 mg total) by mouth 2 (two) times a day, Disp: 180 tablet, Rfl: 1    Calcium Carbonate-Vitamin D (Calcium 600+D) 600-400 MG-UNIT per tablet, Take 1 tablet by mouth daily, Disp: , Rfl:     cholecalciferol (VITAMIN D3) 400 units tablet, Take 2,000 Units by mouth daily, Disp: , Rfl:     eletriptan (Relpax) 40 MG tablet, Take 1 tablet (40 mg total) by mouth daily as needed for migraine, Disp: 30 tablet, Rfl: 0    levonorgestrel (MIRENA) 20 MCG/24HR IUD, 1 each by Intrauterine route, Disp: , Rfl:     Magnesium 400 MG TABS, Take 400 mg by mouth, Disp: , Rfl:     Multiple Vitamins-Minerals (MULTIVITAMIN ADULT PO), Take 1 tablet by mouth daily, Disp: , Rfl:       PHYSICAL EXAMINATION   Physical Exam  Vitals and nursing note reviewed  Constitutional:       General: She is not in acute distress  Appearance: Normal appearance  She is well-developed and well-groomed  She is not ill-appearing  HENT:      Head: Normocephalic and atraumatic        Right Ear: Tympanic membrane, ear canal and external ear normal       Left Ear: Tympanic membrane, ear canal and external ear normal       Nose: Nose normal    Eyes:      Conjunctiva/sclera: Conjunctivae normal       Pupils: Pupils are equal, round, and reactive to light  Neck:      Vascular: No carotid bruit  Cardiovascular:      Rate and Rhythm: Normal rate and regular rhythm  Pulmonary:      Effort: Pulmonary effort is normal  No respiratory distress  Breath sounds: Normal breath sounds and air entry  Lymphadenopathy:      Cervical: No cervical adenopathy  Skin:     General: Skin is warm and dry  Neurological:      General: No focal deficit present  Mental Status: She is alert and oriented to person, place, and time  Psychiatric:         Attention and Perception: Attention normal          Mood and Affect: Mood and affect normal          Speech: Speech normal          Behavior: Behavior normal          Thought Content:  Thought content normal

## 2021-06-16 DIAGNOSIS — F41.8 DEPRESSION WITH ANXIETY: ICD-10-CM

## 2021-06-17 RX ORDER — ALPRAZOLAM 0.25 MG/1
0.25 TABLET ORAL
Qty: 15 TABLET | Refills: 0 | Status: SHIPPED | OUTPATIENT
Start: 2021-06-17 | End: 2022-06-14

## 2022-01-06 DIAGNOSIS — F41.8 DEPRESSION WITH ANXIETY: ICD-10-CM

## 2022-01-09 RX ORDER — BUPROPION HYDROCHLORIDE 200 MG/1
TABLET, EXTENDED RELEASE ORAL
Qty: 180 TABLET | Refills: 0 | Status: SHIPPED | OUTPATIENT
Start: 2022-01-09 | End: 2022-04-05

## 2022-04-05 DIAGNOSIS — F41.8 DEPRESSION WITH ANXIETY: ICD-10-CM

## 2022-04-05 RX ORDER — BUPROPION HYDROCHLORIDE 200 MG/1
TABLET, EXTENDED RELEASE ORAL
Qty: 180 TABLET | Refills: 0 | Status: SHIPPED | OUTPATIENT
Start: 2022-04-05 | End: 2022-06-15 | Stop reason: SDUPTHER

## 2022-06-14 DIAGNOSIS — F41.8 DEPRESSION WITH ANXIETY: ICD-10-CM

## 2022-06-14 RX ORDER — ALPRAZOLAM 0.25 MG/1
TABLET ORAL
Qty: 15 TABLET | Refills: 0 | Status: SHIPPED | OUTPATIENT
Start: 2022-06-14

## 2022-06-15 DIAGNOSIS — F41.8 DEPRESSION WITH ANXIETY: ICD-10-CM

## 2022-06-15 RX ORDER — BUPROPION HYDROCHLORIDE 200 MG/1
200 TABLET, EXTENDED RELEASE ORAL 2 TIMES DAILY
Qty: 180 TABLET | Refills: 0 | Status: SHIPPED | OUTPATIENT
Start: 2022-06-15

## 2022-06-15 NOTE — TELEPHONE ENCOUNTER
Received fax from [address] requesting a refill of buPROPion (WELLBUTRIN SR) 200 MG 12 hr tablet  Patient's last OV was 6/9/2021  Patient does not have a future scheduled appointment  Will forward to provider to advise refill, as per protocol

## 2022-08-08 ENCOUNTER — OFFICE VISIT (OUTPATIENT)
Dept: FAMILY MEDICINE CLINIC | Facility: CLINIC | Age: 47
End: 2022-08-08
Payer: COMMERCIAL

## 2022-08-08 VITALS
BODY MASS INDEX: 27.82 KG/M2 | TEMPERATURE: 98.8 F | SYSTOLIC BLOOD PRESSURE: 130 MMHG | HEART RATE: 84 BPM | HEIGHT: 65 IN | OXYGEN SATURATION: 99 % | WEIGHT: 167 LBS | DIASTOLIC BLOOD PRESSURE: 100 MMHG

## 2022-08-08 DIAGNOSIS — M54.50 ACUTE LEFT-SIDED LOW BACK PAIN WITHOUT SCIATICA: Primary | ICD-10-CM

## 2022-08-08 PROCEDURE — 99213 OFFICE O/P EST LOW 20 MIN: CPT | Performed by: NURSE PRACTITIONER

## 2022-08-08 RX ORDER — NAPROXEN 500 MG/1
500 TABLET ORAL 2 TIMES DAILY WITH MEALS
Qty: 30 TABLET | Refills: 0 | Status: SHIPPED | OUTPATIENT
Start: 2022-08-08

## 2022-08-08 RX ORDER — ATOGEPANT 60 MG/1
1 TABLET ORAL DAILY
COMMUNITY
Start: 2022-07-11

## 2022-08-08 RX ORDER — METHOCARBAMOL 500 MG/1
500 TABLET, FILM COATED ORAL 4 TIMES DAILY
Qty: 30 TABLET | Refills: 0 | Status: SHIPPED | OUTPATIENT
Start: 2022-08-08

## 2022-08-08 NOTE — PROGRESS NOTES
[facility]    ASSESSMENT AND PLAN     1  Acute left-sided low back pain without sciatica  Assessment & Plan:  Low back pain - appears muscular  Treat today with Robaxin and Naproxen  OTC treatments reviewed  May continue alternating ice/heat, Lidoderm patch, Biofreeze gel  Rest and gentle stretching  Exercise suggestions attached to AVS    She has routine check scheduled next week  Will reassess symptoms at that time  Consider PT if persistent  F/U sooner with change or concerns  Orders:  -     methocarbamol (ROBAXIN) 500 mg tablet; Take 1 tablet (500 mg total) by mouth 4 (four) times a day  -     naproxen (Naprosyn) 500 mg tablet; Take 1 tablet (500 mg total) by mouth 2 (two) times a day with meals         SUBJECTIVE       Patient ID: Mica Thakkar is a 55 y o  female  Chief Complaint   Patient presents with    Back Pain     Back pain since Saturday, injured when bending over pulling weeds  Lower left side       HISTORY OF PRESENT ILLNESS    Presents with back pain  Started Saturday while pulling weeds  Bent wrong and felt something pull  Initial pain but was able to continue yard work  Has been slowly worsening  Pain is isolated to lower left side  Notes pain with elevating legs (both)  Sitting or walking for extended periods of time  Home tx: ice/heat, Advil, tylenol  Minimal to no relief  No bowel/bladder issues  Back Pain  This is a new problem  The current episode started today  The problem occurs intermittently  The problem has been gradually worsening since onset  The pain is present in the sacro-iliac  The quality of the pain is described as aching, shooting and stabbing  The pain is at a severity of 7/10  The symptoms are aggravated by position  Pertinent negatives include no abdominal pain, bladder incontinence, bowel incontinence, chest pain, dysuria, fever, headaches, leg pain, numbness, paresis, paresthesias, pelvic pain, perianal numbness, tingling, weakness or weight loss  The following portions of the patient's history were reviewed and updated as appropriate: allergies, current medications, past family history, past medical history, past social history, past surgical history, and problem list     REVIEW OF SYSTEMS  Review of Systems   Constitutional: Negative for fever and weight loss  Cardiovascular: Negative for chest pain  Gastrointestinal: Negative for abdominal pain and bowel incontinence  Genitourinary: Negative for bladder incontinence, dysuria and pelvic pain  Musculoskeletal: Positive for back pain  Neurological: Negative for tingling, weakness, numbness, headaches and paresthesias         OBJECTIVE      VITAL SIGNS  /100 (BP Location: Right arm, Patient Position: Sitting, Cuff Size: Adult)   Pulse 84   Temp 98 8 °F (37 1 °C)   Ht 5' 5" (1 651 m)   Wt 75 8 kg (167 lb)   SpO2 99%   BMI 27 79 kg/m²     CURRENT MEDICATIONS    Current Outpatient Medications:     ALPRAZolam (XANAX) 0 25 mg tablet, TAKE 1 TABLET BY MOUTH DAILY AT BEDTIME AS NEEDED FOR ANXIETY, Disp: 15 tablet, Rfl: 0    buPROPion (WELLBUTRIN SR) 200 MG 12 hr tablet, Take 1 tablet (200 mg total) by mouth 2 (two) times a day, Disp: 180 tablet, Rfl: 0    Calcium Carbonate-Vitamin D 600-400 MG-UNIT per tablet, Take 1 tablet by mouth daily, Disp: , Rfl:     levonorgestrel (MIRENA) 20 MCG/24HR IUD, 1 each by Intrauterine route, Disp: , Rfl:     methocarbamol (ROBAXIN) 500 mg tablet, Take 1 tablet (500 mg total) by mouth 4 (four) times a day, Disp: 30 tablet, Rfl: 0    Multiple Vitamins-Minerals (MULTIVITAMIN ADULT PO), Take 1 tablet by mouth daily, Disp: , Rfl:     naproxen (Naprosyn) 500 mg tablet, Take 1 tablet (500 mg total) by mouth 2 (two) times a day with meals, Disp: 30 tablet, Rfl: 0    Qulipta 60 MG TABS, Take 1 tablet by mouth daily, Disp: , Rfl:     cholecalciferol (VITAMIN D3) 400 units tablet, Take 2,000 Units by mouth daily (Patient not taking: Reported on 8/8/2022), Disp: , Rfl:     eletriptan (Relpax) 40 MG tablet, Take 1 tablet (40 mg total) by mouth daily as needed for migraine, Disp: 30 tablet, Rfl: 0    Magnesium 400 MG TABS, Take 400 mg by mouth (Patient not taking: Reported on 8/8/2022), Disp: , Rfl:       PHYSICAL EXAMINATION   Physical Exam  Vitals and nursing note reviewed  Constitutional:       General: She is not in acute distress  Appearance: Normal appearance  She is not ill-appearing  HENT:      Head: Normocephalic  Pulmonary:      Effort: Pulmonary effort is normal  No respiratory distress  Musculoskeletal:      Lumbar back: Spasms and tenderness (lower back muscle - no spinal pain) present  No swelling, edema or bony tenderness  Decreased range of motion  Neurological:      Mental Status: She is alert and oriented to person, place, and time     Psychiatric:         Attention and Perception: Attention normal          Mood and Affect: Mood normal          Behavior: Behavior normal

## 2022-08-08 NOTE — ASSESSMENT & PLAN NOTE
Low back pain - appears muscular  Treat today with Robaxin and Naproxen  OTC treatments reviewed  May continue alternating ice/heat, Lidoderm patch, Biofreeze gel  Rest and gentle stretching  Exercise suggestions attached to AVS    She has routine check scheduled next week  Will reassess symptoms at that time  Consider PT if persistent  F/U sooner with change or concerns

## 2022-08-09 ENCOUNTER — TELEPHONE (OUTPATIENT)
Dept: ADMINISTRATIVE | Facility: OTHER | Age: 47
End: 2022-08-09

## 2022-08-09 NOTE — TELEPHONE ENCOUNTER
Upon review of the In Basket request we were able to locate, review, and update the patient chart as requested for Mammogram     Any additional questions or concerns should be emailed to the Practice Liaisons via [email or web address] email, please do not reply via In Basket      Thank you  Hassel Aschoff

## 2022-08-09 NOTE — TELEPHONE ENCOUNTER
----- Message from Kesha Song, Cooper Gandara sent at 8/8/2022  8:36 AM EDT -----  Regarding: Care Gap Request  08/08/22 8:36 AM    Hello, our patient Nolan Henry has had Mammogram completed/performed  Please assist in updating the patient chart by pulling the Care Everywhere (CE) document  The date of service is 2/14/22       Thank you,  Kesha Song MA  [facility]

## 2022-08-15 ENCOUNTER — OFFICE VISIT (OUTPATIENT)
Dept: FAMILY MEDICINE CLINIC | Facility: CLINIC | Age: 47
End: 2022-08-15
Payer: COMMERCIAL

## 2022-08-15 VITALS
TEMPERATURE: 98.2 F | HEART RATE: 94 BPM | DIASTOLIC BLOOD PRESSURE: 80 MMHG | SYSTOLIC BLOOD PRESSURE: 126 MMHG | WEIGHT: 168.2 LBS | BODY MASS INDEX: 27.99 KG/M2 | OXYGEN SATURATION: 98 %

## 2022-08-15 DIAGNOSIS — F41.8 DEPRESSION WITH ANXIETY: Primary | ICD-10-CM

## 2022-08-15 DIAGNOSIS — G47.9 SLEEP DISTURBANCE: ICD-10-CM

## 2022-08-15 DIAGNOSIS — Z13.220 SCREENING, LIPID: ICD-10-CM

## 2022-08-15 DIAGNOSIS — T75.3XXA MOTION SICKNESS, INITIAL ENCOUNTER: ICD-10-CM

## 2022-08-15 DIAGNOSIS — G89.29 CHRONIC NONINTRACTABLE HEADACHE, UNSPECIFIED HEADACHE TYPE: ICD-10-CM

## 2022-08-15 DIAGNOSIS — Z12.11 SCREEN FOR COLON CANCER: ICD-10-CM

## 2022-08-15 DIAGNOSIS — R51.9 CHRONIC NONINTRACTABLE HEADACHE, UNSPECIFIED HEADACHE TYPE: ICD-10-CM

## 2022-08-15 DIAGNOSIS — R73.01 ELEVATED FASTING GLUCOSE: ICD-10-CM

## 2022-08-15 PROCEDURE — 99214 OFFICE O/P EST MOD 30 MIN: CPT | Performed by: NURSE PRACTITIONER

## 2022-08-15 RX ORDER — HYDROXYZINE HYDROCHLORIDE 25 MG/1
25 TABLET, FILM COATED ORAL
Qty: 30 TABLET | Refills: 1 | Status: SHIPPED | OUTPATIENT
Start: 2022-08-15 | End: 2022-08-15 | Stop reason: SDUPTHER

## 2022-08-15 RX ORDER — HYDROXYZINE HYDROCHLORIDE 25 MG/1
25 TABLET, FILM COATED ORAL
Qty: 30 TABLET | Refills: 1 | Status: SHIPPED | OUTPATIENT
Start: 2022-08-15

## 2022-08-15 RX ORDER — BUPROPION HYDROCHLORIDE 200 MG/1
200 TABLET, EXTENDED RELEASE ORAL 2 TIMES DAILY
Qty: 180 TABLET | Refills: 1 | Status: SHIPPED | OUTPATIENT
Start: 2022-08-15 | End: 2022-10-20 | Stop reason: SDUPTHER

## 2022-08-15 RX ORDER — MECLIZINE HCL 12.5 MG/1
12.5 TABLET ORAL 3 TIMES DAILY PRN
Qty: 30 TABLET | Refills: 0 | Status: SHIPPED | OUTPATIENT
Start: 2022-08-15

## 2022-08-16 ENCOUNTER — TELEPHONE (OUTPATIENT)
Dept: PSYCHIATRY | Facility: CLINIC | Age: 47
End: 2022-08-16

## 2022-08-16 NOTE — PROGRESS NOTES
[facility]    ASSESSMENT AND PLAN     1  Depression with anxiety  Assessment & Plan:  Symptoms reviewed  Feels relatively well controlled during the day, but notes an increase in anxiety at night-when she is trying to go to sleep and or wakes in the middle of the night  Finds her mind racing  Worrying about stressors  Has tried OTC sleep medications with little relief  Will add hydroxyzine p r n  for sleep (#6)  Follow-up if symptoms worsen or become more prevalent during the day  Consider adding SSRI  Otherwise follow-up 6 months-annual physical at that time  Orders:  -     Ambulatory Referral to [address] [place] [address] Therapists; Future  -     buPROPion (WELLBUTRIN SR) 200 MG 12 hr tablet; Take 1 tablet (200 mg total) by mouth 2 (two) times a day  -     hydrOXYzine HCL (ATARAX) 25 mg tablet; Take 1 tablet (25 mg total) by mouth daily at bedtime as needed for anxiety    2  Motion sickness, initial encounter  Comments:  Going on a cruise in September  Concern for motion sickness  Small dose meclizine  She has used this in the past with good relief  Orders:  -     meclizine (ANTIVERT) 12 5 MG tablet; Take 1 tablet (12 5 mg total) by mouth 3 (three) times a day as needed for dizziness    3  Chronic nonintractable headache, unspecified headache type  Comments:  Stable  Managed by Neurology    4  Elevated fasting glucose  Comments:  Recent fasting glucose 101  Check hemoglobin A1c   Orders:  -     Hemoglobin A1C With EAG; Future  -     Hemoglobin A1C With EAG    5  Screening, lipid  -     Lipid panel; Future  -     Lipid panel    6  Sleep disturbance  Assessment & Plan:  Difficulty initiating and then staying asleep at times  Appears secondary to anxiety  Mind wanders and is unable to settle down  Good sleep hygiene reviewed  OTC medications ineffective  Will trial Atarax as above (#1)    Dose /use and potential side effects reviewed    Orders:  -     hydrOXYzine HCL (ATARAX) 25 mg tablet; Take 1 tablet (25 mg total) by mouth daily at bedtime as needed for anxiety    7  Screen for colon cancer  -     Ambulatory referral for colonoscopy; Future         SUBJECTIVE       Patient ID: Ricarda Gaytan is a 55 y o  female  No chief complaint on file  HISTORY OF PRESENT ILLNESS    Routine checkup        The following portions of the patient's history were reviewed and updated as appropriate: allergies, current medications, past family history, past medical history, past social history, past surgical history, and problem list     REVIEW OF SYSTEMS  Review of Systems   Constitutional: Negative  HENT: Negative  Respiratory: Negative  Cardiovascular: Negative  Gastrointestinal: Negative  Genitourinary: Negative  Musculoskeletal: Negative  Skin: Negative  Neurological: Negative  Headaches: Managed  Psychiatric/Behavioral: Positive for sleep disturbance  Negative for dysphoric mood, hallucinations, self-injury and suicidal ideas  The patient is nervous/anxious          OBJECTIVE      VITAL SIGNS  /80 (BP Location: Left arm, Patient Position: Sitting, Cuff Size: Standard)   Pulse 94   Temp 98 2 °F (36 8 °C) (Temporal)   Wt 76 3 kg (168 lb 3 2 oz)   LMP  (LMP Unknown)   SpO2 98%   Breastfeeding No   BMI 27 99 kg/m²     CURRENT MEDICATIONS    Current Outpatient Medications:     ALPRAZolam (XANAX) 0 25 mg tablet, TAKE 1 TABLET BY MOUTH DAILY AT BEDTIME AS NEEDED FOR ANXIETY, Disp: 15 tablet, Rfl: 0    buPROPion (WELLBUTRIN SR) 200 MG 12 hr tablet, Take 1 tablet (200 mg total) by mouth 2 (two) times a day, Disp: 180 tablet, Rfl: 1    cholecalciferol (VITAMIN D3) 400 units tablet, Take 2,000 Units by mouth daily, Disp: , Rfl:     eletriptan (Relpax) 40 MG tablet, Take 1 tablet (40 mg total) by mouth daily as needed for migraine, Disp: 30 tablet, Rfl: 0    hydrOXYzine HCL (ATARAX) 25 mg tablet, Take 1 tablet (25 mg total) by mouth daily at bedtime as needed for anxiety, Disp: 30 tablet, Rfl: 1    levonorgestrel (MIRENA) 20 MCG/24HR IUD, 1 each by Intrauterine route, Disp: , Rfl:     meclizine (ANTIVERT) 12 5 MG tablet, Take 1 tablet (12 5 mg total) by mouth 3 (three) times a day as needed for dizziness, Disp: 30 tablet, Rfl: 0    methocarbamol (ROBAXIN) 500 mg tablet, Take 1 tablet (500 mg total) by mouth 4 (four) times a day, Disp: 30 tablet, Rfl: 0    naproxen (Naprosyn) 500 mg tablet, Take 1 tablet (500 mg total) by mouth 2 (two) times a day with meals, Disp: 30 tablet, Rfl: 0    Qulipta 60 MG TABS, Take 1 tablet by mouth daily, Disp: , Rfl:     Calcium Carbonate-Vitamin D 600-400 MG-UNIT per tablet, Take 1 tablet by mouth daily (Patient not taking: Reported on 8/15/2022), Disp: , Rfl:     Magnesium 400 MG TABS, Take 400 mg by mouth (Patient not taking: No sig reported), Disp: , Rfl:     Multiple Vitamins-Minerals (MULTIVITAMIN ADULT PO), Take 1 tablet by mouth daily (Patient not taking: Reported on 8/15/2022), Disp: , Rfl:       PHYSICAL EXAMINATION   Physical Exam  Vitals and nursing note reviewed  Constitutional:       General: She is not in acute distress  Appearance: Normal appearance  She is well-developed and well-groomed  She is not ill-appearing  HENT:      Head: Normocephalic and atraumatic  Right Ear: Tympanic membrane, ear canal and external ear normal       Left Ear: Tympanic membrane, ear canal and external ear normal       Mouth/Throat:      Mouth: Mucous membranes are moist    Eyes:      Pupils: Pupils are equal, round, and reactive to light  Neck:      Vascular: No carotid bruit  Cardiovascular:      Rate and Rhythm: Normal rate and regular rhythm  Pulses:           Posterior tibial pulses are 2+ on the right side and 2+ on the left side  Heart sounds: Normal heart sounds  Pulmonary:      Effort: Pulmonary effort is normal  No respiratory distress  Breath sounds: Normal breath sounds and air entry     Musculoskeletal: Right lower leg: No edema  Left lower leg: No edema  Lymphadenopathy:      Cervical: No cervical adenopathy  Skin:     General: Skin is warm and dry  Neurological:      General: No focal deficit present  Mental Status: She is alert and oriented to person, place, and time  Psychiatric:         Attention and Perception: Attention normal          Mood and Affect: Mood normal          Speech: Speech normal          Behavior: Behavior normal          Thought Content:  Thought content normal          Cognition and Memory: Cognition normal          Judgment: Judgment normal

## 2022-08-16 NOTE — ASSESSMENT & PLAN NOTE
Difficulty initiating and then staying asleep at times  Appears secondary to anxiety  Mind wanders and is unable to settle down  Good sleep hygiene reviewed  OTC medications ineffective  Will trial Atarax as above (#1)    Dose /use and potential side effects reviewed [id number]

## 2022-08-16 NOTE — ASSESSMENT & PLAN NOTE
Symptoms reviewed  Feels relatively well controlled during the day, but notes an increase in anxiety at night-when she is trying to go to sleep and or wakes in the middle of the night  Finds her mind racing  Worrying about stressors  Has tried OTC sleep medications with little relief  Will add hydroxyzine p r n  for sleep (#6)  Follow-up if symptoms worsen or become more prevalent during the day  Consider adding SSRI  Otherwise follow-up 6 months-annual physical at that time

## 2022-08-19 ENCOUNTER — TELEPHONE (OUTPATIENT)
Dept: PSYCHIATRY | Facility: CLINIC | Age: 47
End: 2022-08-19

## 2022-08-26 ENCOUNTER — TELEPHONE (OUTPATIENT)
Dept: PSYCHIATRY | Facility: CLINIC | Age: 47
End: 2022-08-26

## 2022-10-20 DIAGNOSIS — F41.8 DEPRESSION WITH ANXIETY: ICD-10-CM

## 2022-10-21 RX ORDER — BUPROPION HYDROCHLORIDE 200 MG/1
200 TABLET, EXTENDED RELEASE ORAL 2 TIMES DAILY
Qty: 180 TABLET | Refills: 1 | Status: SHIPPED | OUTPATIENT
Start: 2022-10-21

## 2023-02-13 DIAGNOSIS — T75.3XXA MOTION SICKNESS, INITIAL ENCOUNTER: ICD-10-CM

## 2023-02-15 RX ORDER — MECLIZINE HCL 12.5 MG/1
12.5 TABLET ORAL 3 TIMES DAILY PRN
Qty: 30 TABLET | Refills: 0 | OUTPATIENT
Start: 2023-02-15

## 2023-02-15 NOTE — TELEPHONE ENCOUNTER
Pls call pt  This was prescribed for a trip (cruise)  Is she having symptoms that she is requesting refill?  If so, pls schedule appt

## 2023-02-15 NOTE — TELEPHONE ENCOUNTER
LVM for pt that the meclizine (ANTIVERT)  was prescribed for a trip (cruise) so the request was denied  Advised pt if she is having symptoms that she will need to schedule an appt to be evaluated

## 2023-06-22 ENCOUNTER — OFFICE VISIT (OUTPATIENT)
Dept: FAMILY MEDICINE CLINIC | Facility: CLINIC | Age: 48
End: 2023-06-22
Payer: COMMERCIAL

## 2023-06-22 VITALS
TEMPERATURE: 98.7 F | OXYGEN SATURATION: 99 % | WEIGHT: 162 LBS | SYSTOLIC BLOOD PRESSURE: 110 MMHG | DIASTOLIC BLOOD PRESSURE: 60 MMHG | BODY MASS INDEX: 26.99 KG/M2 | HEART RATE: 64 BPM | HEIGHT: 65 IN

## 2023-06-22 DIAGNOSIS — F40.243 ANXIETY WITH FLYING: ICD-10-CM

## 2023-06-22 DIAGNOSIS — Z13.0 SCREENING, ANEMIA, DEFICIENCY, IRON: ICD-10-CM

## 2023-06-22 DIAGNOSIS — M54.50 CHRONIC BILATERAL LOW BACK PAIN WITHOUT SCIATICA: ICD-10-CM

## 2023-06-22 DIAGNOSIS — M25.511 ACUTE PAIN OF RIGHT SHOULDER: Primary | ICD-10-CM

## 2023-06-22 DIAGNOSIS — F41.8 DEPRESSION WITH ANXIETY: ICD-10-CM

## 2023-06-22 DIAGNOSIS — G89.29 CHRONIC BILATERAL LOW BACK PAIN WITHOUT SCIATICA: ICD-10-CM

## 2023-06-22 DIAGNOSIS — H81.13 BENIGN PAROXYSMAL POSITIONAL VERTIGO DUE TO BILATERAL VESTIBULAR DISORDER: ICD-10-CM

## 2023-06-22 DIAGNOSIS — Z13.220 SCREENING, LIPID: ICD-10-CM

## 2023-06-22 DIAGNOSIS — G47.9 SLEEP DISTURBANCE: ICD-10-CM

## 2023-06-22 DIAGNOSIS — E56.9 VITAMIN DEFICIENCY: ICD-10-CM

## 2023-06-22 DIAGNOSIS — Z13.1 SCREENING FOR DIABETES MELLITUS: ICD-10-CM

## 2023-06-22 DIAGNOSIS — Z13.29 SCREENING FOR THYROID DISORDER: ICD-10-CM

## 2023-06-22 PROCEDURE — 99214 OFFICE O/P EST MOD 30 MIN: CPT | Performed by: NURSE PRACTITIONER

## 2023-06-22 RX ORDER — NAPROXEN 500 MG/1
500 TABLET ORAL 2 TIMES DAILY WITH MEALS
Qty: 30 TABLET | Refills: 0 | Status: SHIPPED | OUTPATIENT
Start: 2023-06-22

## 2023-06-22 RX ORDER — METHOCARBAMOL 500 MG/1
500 TABLET, FILM COATED ORAL 4 TIMES DAILY
Qty: 30 TABLET | Refills: 0 | Status: SHIPPED | OUTPATIENT
Start: 2023-06-22

## 2023-06-22 RX ORDER — RIZATRIPTAN BENZOATE 10 MG/1
TABLET ORAL
COMMUNITY
Start: 2023-04-29

## 2023-06-22 RX ORDER — ALPRAZOLAM 0.25 MG/1
0.25 TABLET ORAL AS NEEDED
Qty: 15 TABLET | Refills: 0 | Status: SHIPPED | OUTPATIENT
Start: 2023-06-22

## 2023-06-22 RX ORDER — HYDROXYZINE HYDROCHLORIDE 25 MG/1
25 TABLET, FILM COATED ORAL
Qty: 30 TABLET | Refills: 0 | Status: SHIPPED | OUTPATIENT
Start: 2023-06-22

## 2023-06-22 NOTE — PROGRESS NOTES
[facility]    ASSESSMENT AND PLAN     1  Acute pain of right shoulder  Assessment & Plan:  Some slow improvement  Appears over use  Rx for Robaxin/Naproxen (effective in past)  Conservative care reviewed: Ice/heat, gentle stretching  Advise PT if symptoms do not continue to improve  Pt agreeable with plan    Orders:  -     methocarbamol (ROBAXIN) 500 mg tablet; Take 1 tablet (500 mg total) by mouth 4 (four) times a day  -     naproxen (Naprosyn) 500 mg tablet; Take 1 tablet (500 mg total) by mouth 2 (two) times a day with meals    2  Chronic bilateral low back pain without sciatica  Assessment & Plan:  Intermittent flares  Localized without radiating symptoms  Appears muscular  Discussed benefit from PT - core strengthening  Pt agreeable and referral placed    Orders:  -     Ambulatory Referral to Physical Therapy; Future    3  Depression with anxiety  Assessment & Plan:  Stable  Denies depression  Compliant with Wellbutrin  Occasional anxiety - well controlled with Hydroxyzine  Xanax for flight anxiety (#5)    Orders:  -     hydrOXYzine HCL (ATARAX) 25 mg tablet; Take 1 tablet (25 mg total) by mouth daily at bedtime as needed for anxiety (insomnia)    4  Benign paroxysmal positional vertigo due to bilateral vestibular disorder  Assessment & Plan:  History of - recurrent flares  Referral to PT - vestibular therapy    Orders:  -     Ambulatory Referral to Physical Therapy; Future    5  Anxiety with flying  -     ALPRAZolam (XANAX) 0 25 mg tablet; Take 1 tablet (0 25 mg total) by mouth if needed for anxiety    6  Sleep disturbance  -     hydrOXYzine HCL (ATARAX) 25 mg tablet; Take 1 tablet (25 mg total) by mouth daily at bedtime as needed for anxiety (insomnia)    7  Screening, lipid  -     Lipid panel; Future  -     Lipid panel    8  Screening for diabetes mellitus  -     Comprehensive metabolic panel; Future  -     Hemoglobin A1C With EAG;  Future  -     Comprehensive metabolic panel  -     Hemoglobin A1C With EAG    9  Screening for thyroid disorder  -     TSH, 3rd generation with Free T4 reflex; Future  -     TSH, 3rd generation with Free T4 reflex    10  Screening, anemia, deficiency, iron  -     CBC and differential; Future  -     CBC and differential    11  Vitamin deficiency  -     Vitamin D 25 hydroxy; Future  -     Vitamin D 25 hydroxy     BMI Counseling: Body mass index is 26 96 kg/m²  The BMI is above normal  Nutrition recommendations include decreasing portion sizes, moderation in carbohydrate intake and reducing intake of saturated and trans fat  Exercise recommendations include moderate physical activity 150 minutes/week  Rationale for BMI follow-up plan is due to patient being overweight or obese  Healthy lifestyle counseling    Depression Screening and Follow-up Plan: Patient assessed for underlying major depression  Brief counseling provided and recommend additional follow-up/re-evaluation next office visit  Controlled  No SI/HI      Due for annual physical - f/asting labs ordered to complete prior    SUBJECTIVE       Patient ID: Liliana Kirby is a 52 y o  female  Chief Complaint   Patient presents with   • Shoulder Pain     Right shoulder, feels like a pinched nerve x 1 week, also with migraine x 2 days  Mammo was done in feb 2023  Needs refills on meds  no other concerns at this time         HISTORY OF PRESENT ILLNESS    Presents with c/o right shoulder pain  Started about one week ago  No specific injury but was doing yard work last week and listing mulch bags  Shooting pain posterior shoulder, radiating down lateral arm  Has been slowly improving  Some tingling down into fingers and hand  No loss of strength or      C/O         The following portions of the patient's history were reviewed and updated as appropriate: allergies, current medications, past family history, past medical history, past social history, past surgical history, and problem list     REVIEW OF SYSTEMS  Review of "Systems   Constitutional: Negative for activity change, appetite change, fatigue and fever  Respiratory: Negative  Cardiovascular: Negative  Musculoskeletal: Positive for arthralgias and back pain  Neurological:        Ongoing vertigo   Psychiatric/Behavioral: Positive for sleep disturbance  Negative for decreased concentration, dysphoric mood, self-injury and suicidal ideas  The patient is nervous/anxious          OBJECTIVE      VITAL SIGNS  /60 (BP Location: Right arm, Patient Position: Sitting, Cuff Size: Standard)   Pulse 64   Temp 98 7 °F (37 1 °C) (Temporal)   Ht 5' 5\" (1 651 m)   Wt 73 5 kg (162 lb)   SpO2 99%   BMI 26 96 kg/m²     CURRENT MEDICATIONS    Current Outpatient Medications:   •  ALPRAZolam (XANAX) 0 25 mg tablet, Take 1 tablet (0 25 mg total) by mouth if needed for anxiety, Disp: 15 tablet, Rfl: 0  •  buPROPion (WELLBUTRIN SR) 200 MG 12 hr tablet, Take 1 tablet (200 mg total) by mouth 2 (two) times a day, Disp: 180 tablet, Rfl: 1  •  cholecalciferol (VITAMIN D3) 400 units tablet, Take 2,000 Units by mouth daily, Disp: , Rfl:   •  hydrOXYzine HCL (ATARAX) 25 mg tablet, Take 1 tablet (25 mg total) by mouth daily at bedtime as needed for anxiety (insomnia), Disp: 30 tablet, Rfl: 0  •  levonorgestrel (MIRENA) 20 MCG/24HR IUD, 1 each by Intrauterine route, Disp: , Rfl:   •  meclizine (ANTIVERT) 12 5 MG tablet, Take 1 tablet (12 5 mg total) by mouth 3 (three) times a day as needed for dizziness, Disp: 30 tablet, Rfl: 0  •  methocarbamol (ROBAXIN) 500 mg tablet, Take 1 tablet (500 mg total) by mouth 4 (four) times a day, Disp: 30 tablet, Rfl: 0  •  naproxen (Naprosyn) 500 mg tablet, Take 1 tablet (500 mg total) by mouth 2 (two) times a day with meals, Disp: 30 tablet, Rfl: 0  •  Qulipta 60 MG TABS, Take 1 tablet by mouth daily, Disp: , Rfl:   •  Calcium Carbonate-Vitamin D 600-400 MG-UNIT per tablet, Take 1 tablet by mouth daily (Patient not taking: Reported on 8/15/2022), Disp: , " Rfl:   •  eletriptan (Relpax) 40 MG tablet, Take 1 tablet (40 mg total) by mouth daily as needed for migraine (Patient not taking: Reported on 6/22/2023), Disp: 30 tablet, Rfl: 0  •  Magnesium 400 MG TABS, Take 400 mg by mouth (Patient not taking: Reported on 8/8/2022), Disp: , Rfl:   •  Multiple Vitamins-Minerals (MULTIVITAMIN ADULT PO), Take 1 tablet by mouth daily (Patient not taking: Reported on 8/15/2022), Disp: , Rfl:   •  rizatriptan (MAXALT) 10 mg tablet, TAKE 1 TABLET AT MIGRAINE ONSET  MAY REPEAT IN 1HR  MAX 2 TABS/DAY, 2 DAYS/WEEK, Disp: , Rfl:       PHYSICAL EXAMINATION   Physical Exam  Vitals and nursing note reviewed  Constitutional:       General: She is not in acute distress  Appearance: Normal appearance  She is not ill-appearing  HENT:      Head: Normocephalic  Pulmonary:      Effort: Pulmonary effort is normal  No respiratory distress  Musculoskeletal:      Right shoulder: No swelling, tenderness or bony tenderness  Normal range of motion  Normal strength  Normal pulse  Neurological:      Mental Status: She is alert and oriented to person, place, and time  Psychiatric:         Attention and Perception: Attention normal          Mood and Affect: Mood normal          Behavior: Behavior normal          Thought Content:  Thought content normal

## 2023-06-23 PROBLEM — M54.50 CHRONIC BILATERAL LOW BACK PAIN WITHOUT SCIATICA: Status: ACTIVE | Noted: 2023-06-23

## 2023-06-23 PROBLEM — G89.29 CHRONIC BILATERAL LOW BACK PAIN WITHOUT SCIATICA: Status: ACTIVE | Noted: 2023-06-23

## 2023-06-23 PROBLEM — M25.511 ACUTE PAIN OF RIGHT SHOULDER: Status: ACTIVE | Noted: 2023-06-23

## 2023-06-23 NOTE — ASSESSMENT & PLAN NOTE
Some slow improvement  Appears over use  Rx for Robaxin/Naproxen (effective in past)  Conservative care reviewed: Ice/heat, gentle stretching  Advise PT if symptoms do not continue to improve    Pt agreeable with plan

## 2023-06-23 NOTE — ASSESSMENT & PLAN NOTE
Stable  Denies depression  Compliant with Wellbutrin  Occasional anxiety - well controlled with Hydroxyzine     Xanax for flight anxiety (#5)

## 2023-06-23 NOTE — ASSESSMENT & PLAN NOTE
Intermittent flares  Localized without radiating symptoms  Appears muscular  Discussed benefit from PT - core strengthening   Pt agreeable and referral placed

## 2023-07-10 NOTE — PROGRESS NOTES
PT Evaluation     Today's date: 2023  Patient name: Salud Mejia  : 1975  MRN: [id number]  Referring provider: GREG Shaffer  Dx:   Encounter Diagnosis     ICD-10-CM    1. Chronic bilateral low back pain without sciatica  M54.50 Ambulatory Referral to Physical Therapy    G89.29       2. Benign paroxysmal positional vertigo due to bilateral vestibular disorder  H81.13 Ambulatory Referral to Physical Therapy          Start Time: 1545  Stop Time: 164  Total time in clinic (min): 60 minutes    Assessment  Assessment details: 51 yo female referred to PT with dx of  LBP and BPPV, both of intermittent recurrence. Pt presents with 4-8/10 pain scale-with pain mostly R>L L-S region into buttocks. Pt also w/ c/o long h/o dizziness when lying flat or, occ, w/ fast head mov't. Physical exam reveals tightness of hamstrings, hip flexors and ITB w/ R>L hip weakness. Pt w/ lumbar segmental restriction and tenderness noted as well. Vestibular assessment not completed due to time and further assessment of balance and positional testing will be performed at pt's next visit (oculomoter exam normal fixated and unfixated w/ video goggles). Pt demonstrates and/or c/o difficulty with prolonged sitting and standing due to her back pain. Pt is below her prior functional level due to the above limitations. she would benefit from PT intervention in order to address the above deficits so that she may resume her daily activities at home, work and in the community  with less pain and limitation at her premorbid  intensity and duration .    Impairments: abnormal or restricted ROM, impaired physical strength, lacks appropriate home exercise program, pain with function and poor body mechanics    Symptom irritability: moderate  Goals  Pt will achieve the following goals in the next 2-4 weeks  STG 23  Indep with current HEP  Dec LB pain by 1-3 levels  Improve B hip strength by 1/2 grade  Initiate posture and body mechanics education  Complete vestibular evaluation and create further goals to address (if needed)      Pt will achieve the following goals by d/c (8-12 weeks, or as stated in plan)  LTG  indep and compliant with HEP in order to maximize gains achieved in therapy  0-2/10 pain scale for LB in order to feel better and decrease/eliminate use of  pain &/or anti-inflammatory  meds  min to no restriction of trunk AROM with improved LE flexiblity from eval and BLE strength 4-5/5 in order to improve functional mobility  Decrease spasm, soft tissue and segmental restriction of L-S area in order to decrease pain  Exhibit carryover of proper posture and joint conservation techniques and body mechanics in order to dec unnecessary muscle strain and preserve joint integrity  Resume activities,including prolonged standing and sitting, with less pain and limitation at her premorbid  intensity and duration  Resolve all positional dizziness/BPPV sx in order to resume all activities confidently without limitation      Plan  Patient would benefit from: skilled physical therapy  Planned modality interventions: cryotherapy and thermotherapy: hydrocollator packs  Planned therapy interventions: joint mobilization, manual therapy, activity modification, abdominal trunk stabilization, neuromuscular re-education, breathing training, canalith repositioning, patient education, postural training, strengthening, stretching, therapeutic activities, therapeutic exercise and home exercise program  Frequency: 2x week  Duration in visits: 16  Duration in weeks: 8  Plan of Care beginning date: 7/11/2023  Plan of Care expiration date: 9/5/2023  Treatment plan discussed with: patient        Subjective Evaluation    History of Present Illness  Onset date: LB-~ Aug 2022;  vertigo x years. Mechanism of injury: 7/11/23 Eval-upon review of chart: pt went to PCP 6/22/23 due to R shld pain w/ lat upper arm radiation of about 1 week and migraine HA x 2 days. At that appt, pt c/o intermittent LBP (w/o sciatica) since Aug 2022 and intermittent BPPV/vestibular issues. Pt ultimately was referred to PT for LBP and BPPV. Pt given meds for R shld (Robaxin and naprosyn) and conservative Rx of ice/heat and gentle stretching w/ potential PT referral in the future. H/o migraines w/ neurology following (~ onset) currently takes maxalt, relpax, qulipta-managed  No dizziness today currently    LBP began ~ 2022 treated w/ flexeril which helped , but then returned once flexeril use stopped -just "dealt with it as a normal thing", but realized it isn't normal to have back pain. Vertigo  >07-99 years w/ certain positions/mov't (avoids lying flat or w/ head back); when occurs feels N/V; not always true vertigo, but feels dizzy enough she needs to change position. Sleeps w/ pillows to avoid being flat. R shld /scap/neck feeling better since flexeril. R>L L-s radiates into buttock ; no LE radicular pain. LB bothers w/ prolonged sitting, standing; occ bothers when sleep.  (finds relief w/ heat , hooklying position)  Takes meclizine preventatively for dizziness when lying flat (I.e. dentist or amusement park)    ABC score 98.75%  Oswestry LB pain disability-16%          Recurrent probem    Patient Goals  Patient goals for therapy: decreased pain  Patient goal: dec pain and limitation and allow return to previous activities; allow pt to lie flat w/o dizziness  Pain  Current pain ratin  At best pain ratin  At worst pain ratin (in the past year)  Location: R>L L-s  Aggravating factors: standing and sitting (prolonged)  Progression: no change (no change w/ vertigo-still bothers as much when lying flat)    Social Support  Stairs in house: yes (reciprocal pattern to amb)   Lives in: multiple-level home  Lives with: young children (13 yo son)    Employment status: working ([employer])  Exercise history: not regular exerciser    Treatments  Previous treatment: medication (LB)  Current treatment: physical therapy        Objective     Palpation     Additional Palpation Details  Tender/sore to CPA mobs of L2-3-4 w/  restriction noted. Tender throughout L>R buttock/iliac crest/sacral region w/ mild soft tissue restriction/tightness palp    Active Range of Motion   Cervical/Thoracic Spine       Cervical    Flexion: Neck active flexion: WFL. Extension: Neck active extension: WFL. Left lateral flexion: Neck active lateral bend left: WFL. Right lateral flexion: Neck active lateral bend right: WFL. Left rotation: Neck active rotation left: WFL. Right rotation: Neck active rotation right: WFL. Lumbar   Flexion: Active lumbar flexion: tightness in LB. Restriction level: minimal  Extension: Active lumbar extension: feels good. WFL  Left lateral flexion:  WFL  Right lateral flexion:  WFL  Left rotation:  Restriction level: minimal  Right rotation:  Restriction level: minimal    Additional Active Range of Motion Details  Generally stiffness w/ lumbar/trunk AROM    Passive Range of Motion   Left Hip   Flexion: 60 (SLR) degrees   External rotation (90/90): [place] [place]  Internal rotation (90/90): WFL    Right Hip   Flexion: 55 (SLR) degrees   External rotation (90/90): [place] [place]  Internal rotation (90/90):  WFL    Additional Passive Range of Motion Details  Tight hip flexors L>R noted w/ prone knee flex    Strength/Myotome Testing     Left Hip   Planes of Motion   Flexion: 5  Abduction: 4+  External rotation: 5  Internal rotation: 5    Right Hip   Planes of Motion   Flexion: 4  Abduction: 4  External rotation: 5  Internal rotation: 5    Left Knee   Flexion: 5  Extension: 5    Right Knee   Flexion: 5  Extension: 5    Left Ankle/Foot   Dorsiflexion: 5  Plantar flexion: 5    Right Ankle/Foot   Dorsiflexion: 5  Plantar flexion: 5    Additional Strength Details  Deep squat to full  knee flex unsupported without pain  Heel raise (on toes) intact unsupported  Toe raise (on heels) intact unsupported  SLS R 10sec unsupported without pain          L 10sec unsupported without pain    Lower Abdominal Strength:  3+/5 (can lower legs to 60 deg (from table) and hold with back flat)   Upper Abdominal Strength: 5/5 (can flex trunk to sitting position with hands behind head )      Tests     Lumbar   Negative SIJ compression. Left   Negative passive SLR. Right   Negative passive SLR. Left Hip   Negative VANGIE. Right Hip   Negative VANGIE. Neuro Exam:     Oculomotor exam   Oculomotor ROM: WNL and fixated and unfixated w/ video goggles  Resting nystagmus: fixated and unfixated w/ video goggles  Resting nystagmus: not present   Gaze holding nystagmus: not present left  (fixated and unfixated w/ video goggles) and not present right (fixated and unfixated w/ video goggles)  Smooth pursuits: within normal limits  Vertical saccades: normal  Horizontal saccades: normal  Convergence: normal  Cover test: normal  Head thrust: left normal and right normal    Positional testing   Positional testing comment: Oculomotor Additional Notes:  Head shake test (unfixated with video goggles):  Hor-negative   Tragal Pressure-Right-not tested Left-not tested  VOR Cx -normal    Functional outcomes   Functional outcome gait comment: amb indep without AD x clinic distances and parking lot to clinic. Pt without gait deviations noted       Flowsheet Rows    Flowsheet Row Most Recent Value   PT/OT G-Codes    Current Score 83   Projected Score 83   Assessment Type Evaluation             Precautions: depression w/ anxiety; h/o migraine; obesity     7/11/23            Manuals #1 complete eval: positional testing; fga; mctsib; vest goals?    FOTO     Re-eval   S/l Lumbar rot mob (R and L) Grade 2 (inc soreness w/ grade 3)            Prone L-s mobs Lumbar Grade 2 CPA;  lumbar grade 3 UPA R and L and transverse glides lumbar grade 3                                      Neuro Re-Ed             Vestibular educ AE Posture/body mechanics/joint conservation educ                                                                              Ther Ex             Stand:   -Alt hip AROM (f,e,ab)  -squats                          Stand:  ITB stretch R/L             Warrior I hip flexor stretch             -Ham stretch at step  -HC stretch @ step                                       Sit: figure 4 stretch (R/L)                                       Ther Activity                                       Gait Training                                       Modalities

## 2023-07-11 ENCOUNTER — EVALUATION (OUTPATIENT)
Age: 48
End: 2023-07-11
Payer: COMMERCIAL

## 2023-07-11 DIAGNOSIS — G89.29 CHRONIC BILATERAL LOW BACK PAIN WITHOUT SCIATICA: Primary | ICD-10-CM

## 2023-07-11 DIAGNOSIS — M54.50 CHRONIC BILATERAL LOW BACK PAIN WITHOUT SCIATICA: Primary | ICD-10-CM

## 2023-07-11 DIAGNOSIS — H81.13 BENIGN PAROXYSMAL POSITIONAL VERTIGO DUE TO BILATERAL VESTIBULAR DISORDER: ICD-10-CM

## 2023-07-11 PROCEDURE — 97140 MANUAL THERAPY 1/> REGIONS: CPT

## 2023-07-11 PROCEDURE — 97162 PT EVAL MOD COMPLEX 30 MIN: CPT

## 2023-07-11 PROCEDURE — 97112 NEUROMUSCULAR REEDUCATION: CPT

## 2023-07-13 NOTE — PROGRESS NOTES
Daily Note     Today's date: 2023  Patient name: Joann Anaya  : 1975  MRN: [id number]  Referring provider: GREG Melchor  Dx:   Encounter Diagnosis     ICD-10-CM    1. Chronic bilateral low back pain without sciatica  M54.50     G89.29       2. Benign paroxysmal positional vertigo due to bilateral vestibular disorder  H81.13           Start Time: 1655  Stop Time: 1750  Total time in clinic (min): 55 minutes    Subjective: pt reports some LBP-"her normal feeling of it"      Objective: See treatment diary below;  Normal mCTSIB;  FGA;  Negative Liliana Hallpike R/L; Roll test w/o nystagmus , though some inc feeling of nausea/"yuck"; deep head hang w/ sig inc sx (nausea/sweating) and pt unable to alfred position (performed 2 trials to assess)-no nystagmus noted. Assessment: Pt tolerated today's treatment session fair due to positional testing performed which provoked nausea/sweating.  (see objective above)  Initiated hip/LE stretches and strengthening today during session and patient education provided on HEP and technique . Pt challenged with canalith positional testing. Will try positional testing again in 2 sessions (next session is prior to bloodwork pt needs to have, but will be fasting for this). Also plan to use video goggles for unfixating eyes during testing. Pt completed hip/back exer with no adverse reactions  . Pt continues to benefit from skilled PT services. Will continue to encourage HEP and PT attendance while addressing pt's  functional deficits & focusing on progression of POC as patient tolerates. Plan: Continue per plan of care.       Precautions: depression w/ anxiety; h/o migraine; obesity     23           Manuals #1  #2    FOTO     Re-eval   S/l Lumbar rot mob (R and L) Grade 2 (inc soreness w/ grade 3) -           Prone L-s mobs Lumbar Grade 2 CPA;  lumbar grade 3 UPA R and L and transverse glides lumbar grade 3 - Neuro Re-Ed             Vestibular educ AE AE           Posture/body mechanics/joint conservation educ  AE           Deep head hang - Provoked sx-unable to alfred x 2 trials                                                               Ther Ex             Stand:   -Alt hip AROM (f,e,ab)  -squats  x10 ea    x10                        Stand:  ITB stretch R/L  3x15" ea side           Warrior I hip flexor stretch  3x15" ea side           -Ham stretch at step  -HC stretch @ step  3x15" ea side  3x15" ea side                                     Sit: figure 4 stretch (R/L)  3x15" ea side                                     Ther Activity                                       Gait Training                                       Modalities

## 2023-07-18 ENCOUNTER — OFFICE VISIT (OUTPATIENT)
Age: 48
End: 2023-07-18
Payer: COMMERCIAL

## 2023-07-18 DIAGNOSIS — M54.50 CHRONIC BILATERAL LOW BACK PAIN WITHOUT SCIATICA: Primary | ICD-10-CM

## 2023-07-18 DIAGNOSIS — H81.13 BENIGN PAROXYSMAL POSITIONAL VERTIGO DUE TO BILATERAL VESTIBULAR DISORDER: ICD-10-CM

## 2023-07-18 DIAGNOSIS — G89.29 CHRONIC BILATERAL LOW BACK PAIN WITHOUT SCIATICA: Primary | ICD-10-CM

## 2023-07-18 PROCEDURE — 97112 NEUROMUSCULAR REEDUCATION: CPT

## 2023-07-18 PROCEDURE — 97110 THERAPEUTIC EXERCISES: CPT

## 2023-07-20 NOTE — PROGRESS NOTES
Daily Note     Today's date: 2023  Patient name: Jaleel Mix  : 1975  MRN: [id number]  Referring provider: GREG Hernández  Dx:   Encounter Diagnosis     ICD-10-CM    1. Chronic bilateral low back pain without sciatica  M54.50     G89.29       2. Benign paroxysmal positional vertigo due to bilateral vestibular disorder  H81.13           Start Time: 728  Stop Time: 806  Total time in clinic (min): 38 minutes    Subjective: pt reports felt bad after last session due to aggravating dizzy sx. Did ok w/ LB exer. Currently dizziness ok      Objective: See treatment diary below      Assessment: Pt tolerated today's treatment session well    Initiated supine QL stretch, supine PPT and bridging (DLS) today during session. Pt deferred positional testing due to having blood work following session and is currently fasting. Pt completed exer with no adverse reactions  . Pt felt positive relief of sx with supine QL stretch. Pt continues to benefit from skilled PT services. Will continue to encourage HEP and PT attendance while addressing pt's  functional deficits & focusing on progression of POC as patient tolerates. Plan: Continue per plan of care.       Precautions: depression w/ anxiety; h/o migraine; obesity     23          Manuals #1  #2  #3  FOTO     Re-eval   S/l Lumbar rot mob (R and L) Grade 2 (inc soreness w/ grade 3) - -          Prone L-s mobs Lumbar Grade 2 CPA;  lumbar grade 3 UPA R and L and transverse glides lumbar grade 3 - -                                    Neuro Re-Ed             Vestibular educ AE AE -          Posture/body mechanics/joint conservation educ  AE           Deep head hang - Provoked sx-unable to alfred x 2 trials -                                                              Ther Ex             Stand:   -Alt hip AROM (f,e,ab)  -squats  x10 ea    x10 x10 ea  x10                       Stand:  ITB stretch R/L  3x15" ea side 3x15" ea side Warrior I hip flexor stretch  3x15" ea side 3x15" ea side          -Ham stretch at step  -HC stretch @ step  3x15" ea side  3x15" ea side 3x15" ea side  3x15" ea side                                    Sit: figure 4 stretch (R/L)  3x15" ea side 3x15" ea side          Supine DLS:  -PPT  -bridging - - x10 ea          Supine QL stretch - - x3 ea side          Ther Activity                                       Gait Training                                       Modalities

## 2023-07-21 ENCOUNTER — OFFICE VISIT (OUTPATIENT)
Age: 48
End: 2023-07-21
Payer: COMMERCIAL

## 2023-07-21 DIAGNOSIS — H81.13 BENIGN PAROXYSMAL POSITIONAL VERTIGO DUE TO BILATERAL VESTIBULAR DISORDER: ICD-10-CM

## 2023-07-21 DIAGNOSIS — M54.50 CHRONIC BILATERAL LOW BACK PAIN WITHOUT SCIATICA: Primary | ICD-10-CM

## 2023-07-21 DIAGNOSIS — G89.29 CHRONIC BILATERAL LOW BACK PAIN WITHOUT SCIATICA: Primary | ICD-10-CM

## 2023-07-21 PROCEDURE — 97110 THERAPEUTIC EXERCISES: CPT

## 2023-07-21 NOTE — PROGRESS NOTES
Daily Note     Today's date: 2023  Patient name: Salud Mejia  : 1975  MRN: [id number]  Referring provider: GREG Shaffer  Dx:   Encounter Diagnosis     ICD-10-CM    1. Chronic bilateral low back pain without sciatica  M54.50     G89.29       2. Benign paroxysmal positional vertigo due to bilateral vestibular disorder  H81.13           Start Time: 1655  Stop Time: 1735  Total time in clinic (min): 40 minutes    Subjective: c/o some LBP today-has been performing a lot of yardwork which aggravated it. Objective: See treatment diary below      Assessment: Pt tolerated today's treatment session well    Initiated CRT today during session -performed supine deep head hang/Yacovino maneuver;  Pt w/ aggressive response (vertigo), but no nystagmus seen. Pt able to alfred entire sequence of maneuver. Pt felt "yucky" and was diaphoretic during and after maneuver. Felt better after few minutes rest, though was tired (pt felt able to drive home). Discussed w/ pt that fatigue/yucky feeling is not uncommon w/ CRT and that is indicative of having caused a change in system. Will re-assess pt status next visit and asked pt not to take meclizine 24 hours before that appt if she is able (pt alfred if her current sx from CRT persist, she may take the meclizine). Pt completed LB exer with no adverse reactions  . -required VPs for PPT technique. Pt continues to benefit from skilled PT services. Will continue to encourage HEP and PT attendance while addressing pt's  functional deficits & focusing on progression of POC as patient tolerates. Plan: Continue per plan of care.       Precautions: depression w/ anxiety; h/o migraine; obesity     23         Manuals #1  #2  #3 #4 FOTO     Re-eval   S/l Lumbar rot mob (R and L) Grade 2 (inc soreness w/ grade 3) - - -         Prone L-s mobs Lumbar Grade 2 CPA;  lumbar grade 3 UPA R and L and transverse glides lumbar grade 3 - - - Neuro Re-Ed             Vestibular educ AE AE - AE         Posture/body mechanics/joint conservation educ  AE  Ae         Deep head hang - Provoked sx-unable to alfred x 2 trials - Deep head hang/yacovino goggles?                                                             Ther Ex             Stand:   -Alt hip AROM (f,e,ab)  -squats  x10 ea    x10 x10 ea  x10 x10 ea  x10                      Stand:  ITB stretch R/L  3x15" ea side 3x15" ea side 3x15" ea side         Warrior I hip flexor stretch  3x15" ea side 3x15" ea side 3x15" ea side  3x15" ea side         -Ham stretch at step  -HC stretch @ step  3x15" ea side  3x15" ea side 3x15" ea side  3x15" ea side 3x15" ea side  3x15" ea side                                   Sit: figure 4 stretch (R/L)  3x15" ea side 3x15" ea side          Supine DLS:  -PPT  -bridging - - x10 ea x10 ea         Supine QL stretch - - x3 ea side x3 side         Ther Activity                                       Gait Training                                       Modalities

## 2023-07-22 LAB
25(OH)D3 SERPL-MCNC: 44 NG/ML (ref 30–100)
ALBUMIN SERPL-MCNC: 4.7 G/DL (ref 3.6–5.1)
ALBUMIN/GLOB SERPL: 1.6 (CALC) (ref 1–2.5)
ALP SERPL-CCNC: 56 U/L (ref 31–125)
ALT SERPL-CCNC: 13 U/L (ref 6–29)
AST SERPL-CCNC: 15 U/L (ref 10–35)
BASOPHILS # BLD AUTO: 82 CELLS/UL (ref 0–200)
BASOPHILS NFR BLD AUTO: 1.3 %
BILIRUB SERPL-MCNC: 0.4 MG/DL (ref 0.2–1.2)
BUN SERPL-MCNC: 12 MG/DL (ref 7–25)
BUN/CREAT SERPL: NORMAL (CALC) (ref 6–22)
CALCIUM SERPL-MCNC: 9.6 MG/DL (ref 8.6–10.2)
CHLORIDE SERPL-SCNC: 107 MMOL/L (ref 98–110)
CHOLEST SERPL-MCNC: 174 MG/DL
CHOLEST/HDLC SERPL: 3.1 (CALC)
CO2 SERPL-SCNC: 23 MMOL/L (ref 20–32)
CREAT SERPL-MCNC: 0.9 MG/DL (ref 0.5–0.99)
EOSINOPHIL # BLD AUTO: 189 CELLS/UL (ref 15–500)
EOSINOPHIL NFR BLD AUTO: 3 %
ERYTHROCYTE [DISTWIDTH] IN BLOOD BY AUTOMATED COUNT: 12 % (ref 11–15)
EST. AVERAGE GLUCOSE BLD GHB EST-MCNC: 97 MG/DL
EST. AVERAGE GLUCOSE BLD GHB EST-SCNC: 5.4 MMOL/L
GFR/BSA.PRED SERPLBLD CYS-BASED-ARV: 79 ML/MIN/1.73M2
GLOBULIN SER CALC-MCNC: 3 G/DL (CALC) (ref 1.9–3.7)
GLUCOSE SERPL-MCNC: 88 MG/DL (ref 65–99)
HBA1C MFR BLD: 5 % OF TOTAL HGB
HCT VFR BLD AUTO: 42 % (ref 35–45)
HDLC SERPL-MCNC: 56 MG/DL
HGB BLD-MCNC: 14.1 G/DL (ref 11.7–15.5)
LDLC SERPL CALC-MCNC: 102 MG/DL (CALC)
LYMPHOCYTES # BLD AUTO: 1934 CELLS/UL (ref 850–3900)
LYMPHOCYTES NFR BLD AUTO: 30.7 %
MCH RBC QN AUTO: 30.5 PG (ref 27–33)
MCHC RBC AUTO-ENTMCNC: 33.6 G/DL (ref 32–36)
MCV RBC AUTO: 90.9 FL (ref 80–100)
MONOCYTES # BLD AUTO: 491 CELLS/UL (ref 200–950)
MONOCYTES NFR BLD AUTO: 7.8 %
NEUTROPHILS # BLD AUTO: 3604 CELLS/UL (ref 1500–7800)
NEUTROPHILS NFR BLD AUTO: 57.2 %
NONHDLC SERPL-MCNC: 118 MG/DL (CALC)
PLATELET # BLD AUTO: 235 THOUSAND/UL (ref 140–400)
PMV BLD REES-ECKER: 11.4 FL (ref 7.5–12.5)
POTASSIUM SERPL-SCNC: 4.3 MMOL/L (ref 3.5–5.3)
PROT SERPL-MCNC: 7.7 G/DL (ref 6.1–8.1)
RBC # BLD AUTO: 4.62 MILLION/UL (ref 3.8–5.1)
SODIUM SERPL-SCNC: 138 MMOL/L (ref 135–146)
TRIGL SERPL-MCNC: 72 MG/DL
TSH SERPL-ACNC: 1.08 MIU/L
WBC # BLD AUTO: 6.3 THOUSAND/UL (ref 3.8–10.8)

## 2023-07-24 ENCOUNTER — TELEPHONE (OUTPATIENT)
Dept: FAMILY MEDICINE CLINIC | Facility: CLINIC | Age: 48
End: 2023-07-24

## 2023-07-25 ENCOUNTER — OFFICE VISIT (OUTPATIENT)
Age: 48
End: 2023-07-25
Payer: COMMERCIAL

## 2023-07-25 ENCOUNTER — TELEPHONE (OUTPATIENT)
Dept: FAMILY MEDICINE CLINIC | Facility: CLINIC | Age: 48
End: 2023-07-25

## 2023-07-25 DIAGNOSIS — H81.13 BENIGN PAROXYSMAL POSITIONAL VERTIGO DUE TO BILATERAL VESTIBULAR DISORDER: ICD-10-CM

## 2023-07-25 DIAGNOSIS — M54.50 CHRONIC BILATERAL LOW BACK PAIN WITHOUT SCIATICA: Primary | ICD-10-CM

## 2023-07-25 DIAGNOSIS — G89.29 CHRONIC BILATERAL LOW BACK PAIN WITHOUT SCIATICA: Primary | ICD-10-CM

## 2023-07-25 PROCEDURE — 97110 THERAPEUTIC EXERCISES: CPT

## 2023-07-25 PROCEDURE — 97112 NEUROMUSCULAR REEDUCATION: CPT

## 2023-07-25 NOTE — TELEPHONE ENCOUNTER
----- Message from [facility], [address] [facility] sent at 7/22/2023  1:15 PM EDT -----  Pls call pt- due for annual physical.   Will review labs at visit.

## 2023-07-27 ENCOUNTER — APPOINTMENT (OUTPATIENT)
Age: 48
End: 2023-07-27
Payer: COMMERCIAL

## 2023-07-28 ENCOUNTER — OFFICE VISIT (OUTPATIENT)
Age: 48
End: 2023-07-28
Payer: COMMERCIAL

## 2023-07-28 DIAGNOSIS — H81.13 BENIGN PAROXYSMAL POSITIONAL VERTIGO DUE TO BILATERAL VESTIBULAR DISORDER: ICD-10-CM

## 2023-07-28 DIAGNOSIS — M54.50 CHRONIC BILATERAL LOW BACK PAIN WITHOUT SCIATICA: Primary | ICD-10-CM

## 2023-07-28 DIAGNOSIS — G89.29 CHRONIC BILATERAL LOW BACK PAIN WITHOUT SCIATICA: Primary | ICD-10-CM

## 2023-07-28 PROCEDURE — 97140 MANUAL THERAPY 1/> REGIONS: CPT

## 2023-07-28 PROCEDURE — 97112 NEUROMUSCULAR REEDUCATION: CPT

## 2023-07-28 PROCEDURE — 97110 THERAPEUTIC EXERCISES: CPT

## 2023-07-28 NOTE — PROGRESS NOTES
Daily Note     Today's date: 2023  Patient name: Jaleel Mix  : 1975  MRN: [id number]  Referring provider: GREG Hernández  Dx:   Encounter Diagnosis     ICD-10-CM    1. Chronic bilateral low back pain without sciatica  M54.50     G89.29       2. Benign paroxysmal positional vertigo due to bilateral vestibular disorder  H81.13           Start Time: 08  Stop Time: 851  Total time in clinic (min): 39 minutes    Subjective: pt reports feeling inc sx /yucky after last session w/ deep head hang maneuver. Feeling more neck muscle tension past few days and awoke w/migraine HA today. Hasn't eaten anything today yet. No inc pain w/ LB exer-hasn't done them much past few days due to feeling worse w/ dizziness/neck tension      Objective: See treatment diary below      Assessment: Pt tolerated today's treatment session fair  for dizziness/HA sx. Supine head roll in neutral w/o sx/nystagmus; mary ann hallpike R/L w/o sx, but deep head hang inc sx immediately and pt unable to alfred. Performed L s/l test w/ inc sx immediately as well (unable to alfred)-no nystagmus noted in any + sx positions. Pt w/ inc HA as well. Stopped treatment/assessment due to inc HA and pt discomfort. Will re-assess/treat next visit and use video goggles for, hopefully, better observation of nystagmus/eye mov't. Initiated thoracic and lumbar mobs- grade 3-w/ inc tenderness throughout most of ~ T8-L4 region. Also tender w/ UPAs of same region. Relief w/ pelvic rock and s/l lumbar rotation mob (R and L). No inc LBP overall at end of session . Pt completed LB exer with no adverse reactions  . Pt continues to benefit from skilled PT services. Will continue to encourage HEP and PT attendance while addressing pt's  functional deficits & focusing on progression of POC as patient tolerates. Plan: Continue per plan of care.       Precautions: depression w/ anxiety; h/o migraine; obesity     23 7/28/23        Manuals #1  #2  #3 #4 #5 FOTO    Re-eval   S/l Lumbar rot mob (R and L) Grade 2 (inc soreness w/ grade 3) - - - grade 2 R and L s/l        Prone thor/L-s mobs Lumbar Grade 2 CPA;  lumbar grade 3 UPA R and L and transverse glides lumbar grade 3 - - - T-L /UPAs grade 3 ; pelvic rock                                  Neuro Re-Ed             Vestibular educ AE AE - AE AE        Posture/body mechanics/joint conservation educ  AE  Ae         Deep head hang - Provoked sx-unable to alfred x 2 trials - Deep head hang/yacovino assessment only (+ sx/no nystagmus) unable to alfred goggles?        D-H supine - - - - R/L negative        L s/l test - - - - + sx/no nystagmus (unable to alfred)                                  Ther Ex             Stand:   -Alt hip AROM (f,e,ab)  -squats  x10 ea    x10 x10 ea  x10 x10 ea  x10 review                     Stand:  ITB stretch R/L  3x15" ea side 3x15" ea side 3x15" ea side review        Warrior I hip flexor stretch  3x15" ea side 3x15" ea side 3x15" ea side  3x15" ea side review        -Ham stretch at step  -HC stretch @ step  3x15" ea side  3x15" ea side 3x15" ea side  3x15" ea side 3x15" ea side  3x15" ea side review                                  Sit: figure 4 stretch (R/L)  3x15" ea side 3x15" ea side  review        Supine DLS:  -PPT  -bridging - - x10 ea x10 ea x10 ea        Supine QL stretch - - x3 ea side x3 side x3 side        Ther Activity                                       Gait Training                                       Modalities

## 2023-07-31 NOTE — PROGRESS NOTES
Daily Note     Today's date: 2023  Patient name: Vaibhav Ontiveros  : 1975  MRN: [id number]  Referring provider: GREG Gu  Dx:   Encounter Diagnosis     ICD-10-CM    1. Chronic bilateral low back pain without sciatica  M54.50     G89.29       2. Benign paroxysmal positional vertigo due to bilateral vestibular disorder  H81.13           Start Time: 1548  Stop Time: 1630  Total time in clinic (min): 42 minutes    Subjective: pt felt much better in her LB after mobilizations in PT last session. Today head feels fine, but did have HA rest of day after last session. Objective: See treatment diary below      Assessment: Pt tolerated today's treatment session better than previous CRT. Performed positional testing w/ video goggles today during session and patient w/o noted nystagmus in all positions, though deep head hang pt blinking a lot along w/ her sx of vertigo (difficult to assess eyes). Negative [place] Hallpike R/L w/ head/neck in neutral and in ~ 20 deg cerv ext. Inc sx in deep head hang. Pt challenged with Yacovino/deep head hang maneuver, but alfred better than previous sessions when it was performed. Prior to CRT, today, performed grade 3-4 CPA/UPAs of thoracic and lumbar spine that pt felt positive relief with overall (but tender ~T8-9). Pt continues to benefit from skilled PT services. Will continue to encourage HEP and PT attendance while addressing pt's  functional deficits & focusing on progression of POC as patient tolerates. Plan: Continue per plan of care.       Precautions: depression w/ anxiety; h/o migraine; obesity     23       Manuals #1  #2  #3 #4 #5 #6    Re-eval   S/l Lumbar rot mob (R and L) Grade 2 (inc soreness w/ grade 3) - - - grade 2 R and L s/l -       Prone thor/L-s mobs Lumbar Grade 2 CPA;  lumbar grade 3 UPA R and L and transverse glides lumbar grade 3 - - - T-L /UPAs grade 3 ; pelvic rock T-L /UPAs grade 3-4 ; pelvic rock                                 Neuro Re-Ed             Vestibular educ AE AE - AE AE AE       Posture/body mechanics/joint conservation educ  AE  Ae         Deep head hang - Provoked sx-unable to alfred x 2 trials - Deep head hang/yacovino assessment only (+ sx/no nystagmus) unable to alfred deep head hang/yacovino start w/ expanded D-H;  Try semont for ant canal?      D-H supine - - - - R/L negative R/L negative (head/neck neutral and ext ~ 20 deg)       L s/l test - - - - + sx/no nystagmus (unable to alfred) -                                 Ther Ex             Stand:   -Alt hip AROM (f,e,ab)  -squats  x10 ea    x10 x10 ea  x10 x10 ea  x10 review review                    Stand:  ITB stretch R/L  3x15" ea side 3x15" ea side 3x15" ea side review review       Warrior I hip flexor stretch  3x15" ea side 3x15" ea side 3x15" ea side  3x15" ea side review review       -Ham stretch at step  -HC stretch @ step  3x15" ea side  3x15" ea side 3x15" ea side  3x15" ea side 3x15" ea side  3x15" ea side review review                                 Sit: figure 4 stretch (R/L)  3x15" ea side 3x15" ea side  review review       Supine DLS:  -PPT  -bridging - - x10 ea x10 ea x10 ea review       Supine QL stretch - - x3 ea side x3 side x3 side review       Ther Activity                                       Gait Training                                       Modalities

## 2023-08-01 ENCOUNTER — OFFICE VISIT (OUTPATIENT)
Age: 48
End: 2023-08-01
Payer: COMMERCIAL

## 2023-08-01 DIAGNOSIS — M54.50 CHRONIC BILATERAL LOW BACK PAIN WITHOUT SCIATICA: Primary | ICD-10-CM

## 2023-08-01 DIAGNOSIS — H81.13 BENIGN PAROXYSMAL POSITIONAL VERTIGO DUE TO BILATERAL VESTIBULAR DISORDER: ICD-10-CM

## 2023-08-01 DIAGNOSIS — G89.29 CHRONIC BILATERAL LOW BACK PAIN WITHOUT SCIATICA: Primary | ICD-10-CM

## 2023-08-01 PROCEDURE — 97112 NEUROMUSCULAR REEDUCATION: CPT

## 2023-08-01 PROCEDURE — 97140 MANUAL THERAPY 1/> REGIONS: CPT

## 2023-08-04 NOTE — PROGRESS NOTES
Daily Note     Today's date: 2023  Patient name: Marc Morris  : 1975  MRN: [id number]  Referring provider: GREG Cronin  Dx:   Encounter Diagnosis     ICD-10-CM    1. Chronic bilateral low back pain without sciatica  M54.50     G89.29       2. Benign paroxysmal positional vertigo due to bilateral vestibular disorder  H81.13           Start Time: 1500  Stop Time: 1540  Total time in clinic (min): 40 minutes    Subjective: pt more irritable emotional after last session (CRT). Hasn't been in position to "test" dizziness. LB feeling  Better-finding mobs in PT helpful-has been able to perform regular day to day tasks w/o modifying. Objective: See treatment diary below      Assessment: Pt tolerated today's treatment session well   Attempted deep head hang, but pt very symptomatic and stopped treatment (no nystagmus noted). Pt c/o  sx w/ head in forward bent position at times, therefore, performed Kurtzer-hybrid maneuver for horizontal canal.  Pt w/o sx throughout maneuver. Unable to provoke sx w/ pt sitting and performed cerv ext and flex EROm. Cont to assess pt status w/ vertigo next session. Pt finding relief for LB w/ thor-lumbar mobs (CPA/UPA and sacral rock). Pt continues to benefit from skilled PT services. Will continue to encourage HEP and PT attendance while addressing pt's  functional deficits & focusing on progression of POC as patient tolerates. Plan: Continue per plan of care.       Precautions: depression w/ anxiety; h/o migraine; obesity     23      Manuals #1  #2  #3 #4 #5 #6 #7      S/l Lumbar rot mob (R and L) Grade 2 (inc soreness w/ grade 3) - - - grade 2 R and L s/l -  Re-eval     Prone thor/L-s mobs Lumbar Grade 2 CPA;  lumbar grade 3 UPA R and L and transverse glides lumbar grade 3 - - - T-L /UPAs grade 3 ; pelvic rock T-L /UPAs grade 3-4 ; pelvic rock T-L /UPAs grade 3-4 ; pelvic rock Neuro Re-Ed             Vestibular educ AE AE - AE AE AE AE      Posture/body mechanics/joint conservation educ  AE  Ae         Deep head hang - Provoked sx-unable to alfred x 2 trials - Deep head hang/yacovino assessment only (+ sx/no nystagmus) unable to alfred deep head hang/yacovino  start w/ expanded D-H;  Try semont for ant canal?     D-H supine - - - - R/L negative R/L negative (head/neck neutral and ext ~ 20 deg) -      L s/l test - - - - + sx/no nystagmus (unable to alfred) - -      Popzer-Hybrid Maneuver - - - - - - Begin on L -                  Ther Ex             Stand:   -Alt hip AROM (f,e,ab)  -squats  x10 ea    x10 x10 ea  x10 x10 ea  x10 review review review                   Stand:  ITB stretch R/L  3x15" ea side 3x15" ea side 3x15" ea side review review review      Warrior I hip flexor stretch  3x15" ea side 3x15" ea side 3x15" ea side  3x15" ea side review review review      -Ham stretch at step  -HC stretch @ step  3x15" ea side  3x15" ea side 3x15" ea side  3x15" ea side 3x15" ea side  3x15" ea side review review review                                Sit: figure 4 stretch (R/L)  3x15" ea side 3x15" ea side  review review review      Supine DLS:  -PPT  -bridging - - x10 ea x10 ea x10 ea review review      Supine QL stretch - - x3 ea side x3 side x3 side review review      Ther Activity                                       Gait Training                                       Modalities

## 2023-08-07 PROBLEM — E55.9 VITAMIN D DEFICIENCY: Status: ACTIVE | Noted: 2023-08-07

## 2023-08-08 ENCOUNTER — OFFICE VISIT (OUTPATIENT)
Age: 48
End: 2023-08-08
Payer: COMMERCIAL

## 2023-08-08 DIAGNOSIS — H81.13 BENIGN PAROXYSMAL POSITIONAL VERTIGO DUE TO BILATERAL VESTIBULAR DISORDER: ICD-10-CM

## 2023-08-08 DIAGNOSIS — M54.50 CHRONIC BILATERAL LOW BACK PAIN WITHOUT SCIATICA: Primary | ICD-10-CM

## 2023-08-08 DIAGNOSIS — G89.29 CHRONIC BILATERAL LOW BACK PAIN WITHOUT SCIATICA: Primary | ICD-10-CM

## 2023-08-08 PROCEDURE — 97140 MANUAL THERAPY 1/> REGIONS: CPT

## 2023-08-08 PROCEDURE — 97112 NEUROMUSCULAR REEDUCATION: CPT

## 2023-08-09 NOTE — PROGRESS NOTES
PT Re-Evaluation     Today's date: 8/10/2023  Patient name: Simona Vincent  : 1975  MRN: [id number]  Referring provider: GREG Granado  Dx:   Encounter Diagnosis     ICD-10-CM    1. Chronic bilateral low back pain without sciatica  M54.50     G89.29       2. Benign paroxysmal positional vertigo due to bilateral vestibular disorder  H81.13           Start Time: 1500  Stop Time: 1550  Total time in clinic (min): 50 minutes    Assessment  Assessment details: 51 yo female referred to PT with dx of  LBP and BPPV, both of intermittent recurrence. Pt presents with resolving LBP of 0-2/10 pain scale-with pain mostly  L-S region when present. Pt w/ improved alfred for prolonged sitting and standing allowing her to work and alfred house/yard chores easier now. Pt also employing breaks from sitting/standing more often in her day to lessen strain on her back. Pt w/ improved lower abdominal strength, trunk AROM and hamstring flexiblity (though hamstring restriction still present). Pt w/ improved lumbar segmental mobility w/ CPA/UPA mobs- in thoracic region (~ T11-12). Pt cont w/ c/o  dizziness when lying w/ head extended and w/ varying head mov't. Pt hasn't been responding to CRT treatment-initially deep head hang/Yacovino maneuver for ant SCC BPPV and even Kurtzer-Hybrid maneuver for horizonatal SCC BPPV. Performed Motion Sensitivity positional testing today which revealed provocation of sx w/ R/L Erle Reeks, return to sit from supine and from head forward to knee R/L positions, and sitting cerv rot and cerv flex/ext. Initiated habituation program w/ least provocative mov't (sitting cerv rotation) and will cont to assess pt response/status. She would cont to benefit from PT at this time to further improve her sx and develop her HEP in accordance with her progress in order to resume her PLOF.      Impairments: abnormal or restricted ROM, impaired physical strength, lacks appropriate home exercise program, pain with function and poor body mechanics  Other impairment: dizziness    Symptom irritability: moderate  Goals  Pt will achieve the following goals in the next 2-4 weeks  8/10/23  Indep and compliant w/ current HEP (including habituation tasks)  Initiate motion sensitivity habituation program  Improve VAS for dizziness sx to <6/10 at worst      STG 7/11/23  Indep with current HEP (met)  Dec LB pain by 1-3 levels (met)  Improve B hip strength by 1/2 grade   Initiate posture and body mechanics education (met)  Complete vestibular evaluation and create further goals to address (if needed) (met)      Pt will achieve the following goals by d/c (8-12 weeks, or as stated in plan)  LTG  indep and compliant with HEP in order to maximize gains achieved in therapy  0-2/10 pain scale for LB in order to feel better and decrease/eliminate use of  pain &/or anti-inflammatory  meds  min to no restriction of trunk AROM with improved LE flexiblity from eval and BLE strength 4-5/5 in order to improve functional mobility  Decrease spasm, soft tissue and segmental restriction of L-S area in order to decrease pain  Resolve all positional dizziness/BPPV sx in order to resume all activities confidently without limitation  Exhibit carryover of proper posture and joint conservation techniques and body mechanics in order to dec unnecessary muscle strain and preserve joint integrity  Resume activities,including prolonged standing and sitting, with less pain and limitation at her premorbid  intensity and duration        Plan  Patient would benefit from: skilled physical therapy  Planned modality interventions: cryotherapy and thermotherapy: hydrocollator packs  Planned therapy interventions: joint mobilization, manual therapy, activity modification, abdominal trunk stabilization, neuromuscular re-education, breathing training, canalith repositioning, patient education, postural training, strengthening, stretching, therapeutic activities, therapeutic exercise and home exercise program  Other planned therapy interventions: CRT/vestibular habituation tasks/  Frequency: 2x week  Duration in visits: 12  Duration in weeks: 6  Plan of Care beginning date: 8/10/2023  Plan of Care expiration date: 9/21/2023  Treatment plan discussed with: patient        Subjective Evaluation    History of Present Illness  Onset date: LB-~ Aug 2022;  vertigo x years. Mechanism of injury: 8/10/23 re-eval-not taking any pain meds for LBP. Pt more aware of changing positions during the day to avoid prolonged sitting, etc.  Not awakening for sleep due to back pain. Still avoids lying flat due to dizziness, but hasn't "tested" that. Migraines have been managed w/ meds (don't last for days as before if occurs). Able to do house/yard chores w/o LBP issues. Able to aflred sitting for work easier. 7/11/23 Eval-upon review of chart: pt went to PCP 6/22/23 due to R shld pain w/ lat upper arm radiation of about 1 week and migraine HA x 2 days. At that appt, pt c/o intermittent LBP (w/o sciatica) since Aug 2022 and intermittent BPPV/vestibular issues. Pt ultimately was referred to PT for LBP and BPPV. Pt given meds for R shld (Robaxin and naprosyn) and conservative Rx of ice/heat and gentle stretching w/ potential PT referral in the future. H/o migraines w/ neurology following (~2006 onset) currently takes maxalt, relpax, qulipta-managed  No dizziness today currently    LBP began ~ August 2022 treated w/ flexeril which helped , but then returned once flexeril use stopped -just "dealt with it as a normal thing", but realized it isn't normal to have back pain. Vertigo  >30-69 years w/ certain positions/mov't (avoids lying flat or w/ head back); when occurs feels N/V; not always true vertigo, but feels dizzy enough she needs to change position. Sleeps w/ pillows to avoid being flat. R shld /scap/neck feeling better since flexeril.   R>L L-s radiates into buttock ; no LE radicular pain. LB bothers w/ prolonged sitting, standing; occ bothers when sleep. (finds relief w/ heat , hooklying position)  Takes meclizine preventatively for dizziness when lying flat (I.e. dentist or amusement park)    ABC score 98.75%  Oswestry LB pain disability-16%          Recurrent probem    Patient Goals  Patient goals for therapy: decreased pain  Patient goal: dec pain and limitation and allow return to previous activities (progressing); allow pt to lie flat w/o dizziness (progressing)  Pain  Current pain ratin  At best pain ratin  At worst pain ratin (in the past year)  Location: R>L L-s  Aggravating factors: standing and sitting (prolonged)  Progression: no change (no change w/ vertigo-still bothers as much when lying flat)    Social Support  Stairs in house: yes (reciprocal pattern to amb)   Lives in: multiple-level home  Lives with: young children (13 yo son)    Employment status: working ([employer])  Exercise history: not regular exerciser    Treatments  Previous treatment: medication (LB)  Current treatment: physical therapy        Objective     Palpation     Additional Palpation Details  Tender/sore to [employer] mobs of T11-12 w/  restriction noted. Tender throughout L>R buttock/iliac crest/sacral region w/ mild soft tissue restriction/tightness palp    Active Range of Motion   Cervical/Thoracic Spine       Cervical    Flexion: Neck active flexion: WFL. Extension: Neck active extension: WFL. Left lateral flexion: Neck active lateral bend left: WFL. Right lateral flexion: Neck active lateral bend right: WFL. Left rotation: Neck active rotation left: WFL. Right rotation: Neck active rotation right: WFL.            Lumbar   Flexion: Active lumbar flexion: no pain;  Restriction level: minimal  Extension: Active lumbar extension: sore in L-S/iliac crest region B.  WFL  Left lateral flexion: Active left lumbar lateral flexion: =    WFL  Right lateral flexion: Active right lumbar lateral flexion: =  WFL  Left rotation: Active left lumbar rotation: improved. WFL  Right rotation:  WFL    Passive Range of Motion   Left Hip   Flexion: 65 (SLR;  improved) degrees   External rotation (90/90): [facility]  Internal rotation (90/90): WFL    Right Hip   Flexion: 60 (SLR; improved) degrees   External rotation (90/90): [facility]  Internal rotation (90/90):  [facility]    Strength/Myotome Testing     Left Hip   Planes of Motion   Flexion: 5  Abduction: 4+  External rotation: 5  Internal rotation: 5    Right Hip   Planes of Motion   Flexion: 4  Abduction: 4  External rotation: 5  Internal rotation: 5    Left Knee   Flexion: 5  Extension: 5    Right Knee   Flexion: 5  Extension: 5    Left Ankle/Foot   Dorsiflexion: 5  Plantar flexion: 5    Right Ankle/Foot   Dorsiflexion: 5  Plantar flexion: 5    Additional Strength Details  Deep squat to full  knee flex unsupported without pain  Heel raise (on toes) intact unsupported  Toe raise (on heels) intact unsupported  SLS R 10sec unsupported without pain          L 10sec unsupported without pain  Lower Abdominal Strength:  4/5 (can lower legs to 30 deg (from table) and hold with back flat) improved  Upper Abdominal Strength: 5/5 (can flex trunk to sitting position with hands behind head )    Neuro Exam:     Oculomotor exam   Oculomotor ROM: WNL  Resting nystagmus: not present   Gaze holding nystagmus: not present left  and not present right  Smooth pursuits: within normal limits  Vertical saccades: normal  Horizontal saccades: normal  Convergence: normal  Cover test: normal  Head thrust: left normal and right normal    Positional testing   [place]-Hallpike   Left posterior canal: symptomatic  Right posterior canal: symptomatic  Positional testing comment: Motion sensitivity positional testing:  Pt w/ inc sx during:   -R and L [place] Hallpike lasting >30 seconds (VAS 3-6+/10) (stopped due to pt discomfort)  -return to sit from R and L [employer] >30 seconds (VAS 3-5/10)  -sitting cerv rot R/L x 5 lasting >30 seconds (VAS 3-5/10)  -sitting cerv flex/ext x 5 lasting ~ 18 seconds (VAS 3-5/10)  -return to sit from nose to R and L knee >30 seconds ; vertigo in nature (VAS 3-4/10)    Pt also w/  sx of nausea/sweating  w/o nystagmus (fixated); Liliana Hallpike, roll test and deep head hang performed unfixated w/ video goggles w/o nystagmus noted. Functional outcomes   Functional outcome comment: FGA w/o AD (sneakers on)   27/30  Functional outcome gait comment: amb indep without AD x clinic distances and parking lot to clinic. Pt without gait deviations noted       Balance assessments   MCTSIB   Eyes open level surface: 30 sec no sway  Eyes open foam surface: 30 sec no sway  Eyes closed level surface: 30 sec min to no sway  Eyes closed foam surface: 30 sec min sway             Precautions: depression w/ anxiety; h/o migraine; obesity     7/11/23 7/18/23 7/21/23 7/25/23 7/28/23 8/1/23 8/8/23 8/10/23     VAS - - - - - - - 3/10 **    Manuals #1  #2  #3 #4 #5 #6 #7 #8     S/l Lumbar rot mob (R and L) Grade 2 (inc soreness w/ grade 3) - - - grade 2 R and L s/l -  Re-eval hip MMT; VOR?  FOTO    Prone thor/L-s mobs Lumbar Grade 2 CPA;  lumbar grade 3 UPA R and L and transverse glides lumbar grade 3 - - - T-L /UPAs grade 3 ; pelvic rock T-L /UPAs grade 3-4 ; pelvic rock T-L /UPAs grade 3-4 ; pelvic rock  **                              Neuro Re-Ed             Vestibular educ AE AE - AE AE AE AE AE     Posture/body mechanics/joint conservation educ  AE  Ae         Deep head hang - Provoked sx-unable to alfred x 2 trials - Deep head hang/yacovino assessment only (+ sx/no nystagmus) unable to alfred deep head hang/yacovino  -     D-H supine - - - - R/L negative R/L negative (head/neck neutral and ext ~ 20 deg) - -     L s/l test - - - - + sx/no nystagmus (unable to alfred) - -      Kurtzer-Hybrid Maneuver - - - - - - Begin on L -     Motion Sensitivity testing - - - - - - - performed     Sit cerv rot x 5 - - - - - - - Initiated for habituation HEP TID 2 sets     Sit cerv flex/ext x 5- - - - - - - - -                  Ther Ex             Stand:   -Alt hip AROM (f,e,ab)  -squats  x10 ea    x10 x10 ea  x10 x10 ea  x10 review review review review                  Stand:  ITB stretch R/L  3x15" ea side 3x15" ea side 3x15" ea side review review review review     Warrior I hip flexor stretch  3x15" ea side 3x15" ea side 3x15" ea side  3x15" ea side review review review review     -Ham stretch at step  -HC stretch @ step  3x15" ea side  3x15" ea side 3x15" ea side  3x15" ea side 3x15" ea side  3x15" ea side review review review review                               Sit: figure 4 stretch (R/L)  3x15" ea side 3x15" ea side  review review review review     Supine DLS:  -PPT  -bridging - - x10 ea x10 ea x10 ea review review x10 (less range)     Supine QL stretch - - x3 ea side x3 side x3 side review review review     Ther Activity                                       Gait Training                                       Modalities

## 2023-08-10 ENCOUNTER — EVALUATION (OUTPATIENT)
Age: 48
End: 2023-08-10
Payer: COMMERCIAL

## 2023-08-10 DIAGNOSIS — H81.13 BENIGN PAROXYSMAL POSITIONAL VERTIGO DUE TO BILATERAL VESTIBULAR DISORDER: ICD-10-CM

## 2023-08-10 DIAGNOSIS — G89.29 CHRONIC BILATERAL LOW BACK PAIN WITHOUT SCIATICA: Primary | ICD-10-CM

## 2023-08-10 DIAGNOSIS — M54.50 CHRONIC BILATERAL LOW BACK PAIN WITHOUT SCIATICA: Primary | ICD-10-CM

## 2023-08-10 PROCEDURE — 97112 NEUROMUSCULAR REEDUCATION: CPT

## 2023-08-10 PROCEDURE — 97164 PT RE-EVAL EST PLAN CARE: CPT

## 2023-08-11 NOTE — PROGRESS NOTES
Daily Note     Today's date: 8/15/2023  Patient name: German Woodall  : 1975  MRN: [id number]  Referring provider: GREG Augustine  Dx:   Encounter Diagnosis     ICD-10-CM    1. Chronic bilateral low back pain without sciatica  M54.50     G89.29       2. Benign paroxysmal positional vertigo due to bilateral vestibular disorder  H81.13           Start Time: 1715  Stop Time: 1800  Total time in clinic (min): 45 minutes    Subjective: pt reports her back is doing better and she feels she has gained a lot from PT. Pt discouraged w/ dizziness and doesn't feel she can cont w/ treatment for this as she feels worse when performing HEP and feels she can modifiy and adjust  her activity in order to live w/ it. Objective: See treatment diary below      Assessment: Pt tolerated today's treatment session well    Initiated TB w/ bridging and vectors today during session and patient education provided on HEP and technique . Pt completed exer with no adverse reactions  . At this time, pt ready to cont w/ HEP for LBP and management. Pt wishes to discontinue habituation program/PT for dizziness. pt has achieved all goals for LBP.       Plan: d/c from formal PT     Precautions: depression w/ anxiety; h/o migraine; obesity     7/11/23 7/18/23 7/21/23 7/25/23 7/28/23 8/1/23 8/8/23 8/10/23 8/15/23    VAS - - - - - - - 3/10 -    Manuals #1  #2  #3 #4 #5 #6 #7 #8 #9    S/l Lumbar rot mob (R and L) Grade 2 (inc soreness w/ grade 3) - - - grade 2 R and L s/l -  Re-eval  FOTO-done    Prone thor/L-s mobs Lumbar Grade 2 CPA;  lumbar grade 3 UPA R and L and transverse glides lumbar grade 3 - - - T-L /UPAs grade 3 ; pelvic rock T-L /UPAs grade 3-4 ; pelvic rock T-L /UPAs grade 3-4 ; pelvic rock  T-L /UPAs grade 3-4 ; pelvic rock                              Neuro Re-Ed             Vestibular educ AE AE - AE AE AE AE AE AE    Posture/body mechanics/joint conservation educ  AE  Ae         Deep head hang - Provoked sx-unable to alfred x 2 trials - Deep head hang/yacovino assessment only (+ sx/no nystagmus) unable to alfred deep head hang/yacovino  - -    D-H supine - - - - R/L negative R/L negative (head/neck neutral and ext ~ 20 deg) - - -    L s/l test - - - - + sx/no nystagmus (unable to alfred) - -  -    Kurtzer-Hybrid Maneuver - - - - - - Begin on L - -    Motion Sensitivity testing - - - - - - - performed -    Sit cerv rot x 5 - - - - - - - Initiated for habituation HEP TID 2 sets -    Sit cerv flex/ext x 5- - - - - - - - -                  Ther Ex             Stand:   -Alt hip AROM (f,e,ab)  -squats  x10 ea    x10 x10 ea  x10 x10 ea  x10 review review review review alt vectors GTB x 10 ea side                 Stand:  ITB stretch R/L  3x15" ea side 3x15" ea side 3x15" ea side review review review review review    Warrior I hip flexor stretch  3x15" ea side 3x15" ea side 3x15" ea side  3x15" ea side review review review review review    -Ham stretch at step  -HC stretch @ step  3x15" ea side  3x15" ea side 3x15" ea side  3x15" ea side 3x15" ea side  3x15" ea side review review review review review                              Sit: figure 4 stretch (R/L)  3x15" ea side 3x15" ea side  review review review review review    Supine DLS:  -PPT  -bridging - - x10 ea x10 ea x10 ea review review x10 (less range) x10  x10 w/ GTB (hip abd)    Supine QL stretch - - x3 ea side x3 side x3 side review review review review    Ther Activity                                       Gait Training                                       Modalities

## 2023-08-15 ENCOUNTER — OFFICE VISIT (OUTPATIENT)
Age: 48
End: 2023-08-15
Payer: COMMERCIAL

## 2023-08-15 DIAGNOSIS — H81.13 BENIGN PAROXYSMAL POSITIONAL VERTIGO DUE TO BILATERAL VESTIBULAR DISORDER: ICD-10-CM

## 2023-08-15 DIAGNOSIS — M54.50 CHRONIC BILATERAL LOW BACK PAIN WITHOUT SCIATICA: Primary | ICD-10-CM

## 2023-08-15 DIAGNOSIS — G89.29 CHRONIC BILATERAL LOW BACK PAIN WITHOUT SCIATICA: Primary | ICD-10-CM

## 2023-08-15 PROCEDURE — 97110 THERAPEUTIC EXERCISES: CPT

## 2023-08-15 PROCEDURE — 97140 MANUAL THERAPY 1/> REGIONS: CPT

## 2023-08-16 ENCOUNTER — OFFICE VISIT (OUTPATIENT)
Dept: FAMILY MEDICINE CLINIC | Facility: CLINIC | Age: 48
End: 2023-08-16
Payer: COMMERCIAL

## 2023-08-16 VITALS
DIASTOLIC BLOOD PRESSURE: 88 MMHG | OXYGEN SATURATION: 98 % | SYSTOLIC BLOOD PRESSURE: 128 MMHG | WEIGHT: 158 LBS | HEART RATE: 86 BPM | HEIGHT: 65 IN | BODY MASS INDEX: 26.33 KG/M2 | TEMPERATURE: 97.9 F | RESPIRATION RATE: 16 BRPM

## 2023-08-16 DIAGNOSIS — H81.13 BENIGN PAROXYSMAL POSITIONAL VERTIGO DUE TO BILATERAL VESTIBULAR DISORDER: ICD-10-CM

## 2023-08-16 DIAGNOSIS — E55.9 VITAMIN D DEFICIENCY: ICD-10-CM

## 2023-08-16 DIAGNOSIS — F41.8 DEPRESSION WITH ANXIETY: ICD-10-CM

## 2023-08-16 DIAGNOSIS — Z00.00 ANNUAL PHYSICAL EXAM: Primary | ICD-10-CM

## 2023-08-16 DIAGNOSIS — Z12.11 SCREEN FOR COLON CANCER: ICD-10-CM

## 2023-08-16 DIAGNOSIS — G43.809 OTHER MIGRAINE WITHOUT STATUS MIGRAINOSUS, NOT INTRACTABLE: ICD-10-CM

## 2023-08-16 PROCEDURE — 99396 PREV VISIT EST AGE 40-64: CPT | Performed by: NURSE PRACTITIONER

## 2023-08-16 RX ORDER — BUPROPION HYDROCHLORIDE 200 MG/1
TABLET, EXTENDED RELEASE ORAL
Qty: 180 TABLET | Refills: 1 | Status: SHIPPED | OUTPATIENT
Start: 2023-08-16

## 2023-08-16 NOTE — PROGRESS NOTES
[id number] [address] GROUP    NAME: Erick Guardado  AGE: 52 y.o. SEX: female  : 1975     DATE: 2023     Assessment and Plan:   Comprehensive physical exam  PMH and chronic conditions reviewed  Medications reconciled  Preventative care and recommended screenings as below  Follow-up 6 months-routine check  Annual physicals  Follow-up sooner as needed  Problem List Items Addressed This Visit        Cardiovascular and Mediastinum    Migraine     Stable. Managed by [facility]            Nervous and Auditory    Benign positional vertigo     History of - recurrent flares. Reports PT - vestibular therapy made symptoms worse. Refer today to ENT for further evaluation/possible treatments receommendations         Relevant Orders    Ambulatory Referral to Otolaryngology       Other    Depression with anxiety     Reports relatively stable. Denies depression. No SI/HI. Compliant with Wellbutrin. Occasional anxiety - controlled with Hydroxyzine. Vitamin D deficiency   Other Visit Diagnoses     Annual physical exam    -  Primary    Screen for colon cancer        Relevant Orders    Ambulatory Referral to Gastroenterology    BMI 26.0-26.9,adult        Interested in discussing possible medications options. Will refer today to weight management for evalution    Relevant Orders    Ambulatory Referral to Weight Management          Immunizations and preventive care screenings were discussed with patient today. Appropriate education was printed on patient's after visit summary. Counseling:  Alcohol/drug use: discussed moderation in alcohol intake, the recommendations for healthy alcohol use, and avoidance of illicit drug use. Dental Health: discussed importance of regular tooth brushing, flossing, and dental visits.   Injury prevention: discussed safety/seat belts, safety helmets, smoke detectors, carbon dioxide detectors, and smoking near bedding or upholstery. Sexual health: discussed sexually transmitted diseases, partner selection, use of condoms, avoidance of unintended pregnancy, and contraceptive alternatives. · Exercise: the importance of regular exercise/physical activity was discussed. Recommend exercise 3-5 times per week for at least 30 minutes. BMI Counseling: Body mass index is 26.32 kg/m². The BMI is above normal. Nutrition recommendations include decreasing portion sizes, moderation in carbohydrate intake and reducing intake of saturated and trans fat. Exercise recommendations include moderate physical activity 150 minutes/week. Rationale for BMI follow-up plan is due to patient being overweight or obese. Healthy lifestyle counseling    Depression Screening and Follow-up Plan: Patient assessed for underlying major depression. Brief counseling provided and recommend additional follow-up/re-evaluation next office visit. Return in about 6 months (around 2024) for Recheck. Chief Complaint:     Chief Complaint   Patient presents with   • Physical Exam      History of Present Illness:     Adult Annual Physical   Patient here for a comprehensive physical exam. The patient reports problems - as above. Diet and Physical Activity  · Diet/Nutrition: poor diet and over snacking. · Exercise: walking and stretching (exercises learned from PT).       Depression Screening  PHQ-2/9 Depression Screening    Little interest or pleasure in doing things: 0 - not at all  Feeling down, depressed, or hopeless: 0 - not at all  Trouble falling or staying asleep, or sleeping too much: 0 - not at all  Feeling tired or having little energy: 0 - not at all  Poor appetite or overeatin - not at all  Feeling bad about yourself - or that you are a failure or have let yourself or your family down: 0 - not at all  Trouble concentrating on things, such as reading the newspaper or watching television: 0 - not at all  Moving or speaking so slowly that other people could have noticed. Or the opposite - being so fidgety or restless that you have been moving around a lot more than usual: 0 - not at all  Thoughts that you would be better off dead, or of hurting yourself in some way: 0 - not at all  PHQ-9 Score: 0   PHQ-9 Interpretation: No or Minimal depression        General Health  · Sleep: sleeps well. · Hearing: normal - bilateral.  · Vision: no vision problems, goes for regular eye exams, most recent eye exam >1 year ago and Snellen: 20/20 OU. · Dental: regular dental visits. Immunizations: Tdap      /GYN Health  · Patient is: premenopausal  · Last menstrual period: IUD  · Contraceptive method: IUD placement. · Women's health UTD ([facility])  · PAP/HPV (-)   · Mammo completed 2023  · Advised monthly self breast exams  · Advise daily vitamin supplements     Review of Systems:     Review of Systems   Constitutional: Negative. HENT: Negative. Eyes: Negative. Respiratory: Negative. Cardiovascular: Negative. Gastrointestinal: Negative. Genitourinary: Negative. Musculoskeletal: Negative. Skin: Negative. Neurological: Negative. Psychiatric/Behavioral: Negative. Past Medical History:     Past Medical History:   Diagnosis Date   • Allergic    • Anxiety    • Depression    • Headache(784.0)       Past Surgical History:     Past Surgical History:   Procedure Laterality Date   •  SECTION        Social History:     Social History     Socioeconomic History   • Marital status:      Spouse name: None   • Number of children: None   • Years of education: None   • Highest education level: None   Occupational History   • None   Tobacco Use   • Smoking status: Never   • Smokeless tobacco: Never   Vaping Use   • Vaping Use: Never used   Substance and Sexual Activity   • Alcohol use:  Yes     Alcohol/week: 2.0 standard drinks of alcohol     Types: 1 Glasses of wine, 1 Cans of beer per week     Comment: socially   • Drug use: Never   • Sexual activity: Yes     Partners: Male     Birth control/protection: I.U.D. Other Topics Concern   • None   Social History Narrative   • None     Social Determinants of Health     Financial Resource Strain: Not on file   Food Insecurity: Not on file   Transportation Needs: Not on file   Physical Activity: Not on file   Stress: Not on file   Social Connections: Not on file   Intimate Partner Violence: Not on file   Housing Stability: Not on file      Family History:     Family History   Problem Relation Age of Onset   • COPD Mother    • Diabetes Father    • Stroke Father       Current Medications:     Current Outpatient Medications   Medication Sig Dispense Refill   • ALPRAZolam (XANAX) 0.25 mg tablet Take 1 tablet (0.25 mg total) by mouth if needed for anxiety 15 tablet 0   • buPROPion (WELLBUTRIN SR) 200 MG 12 hr tablet TAKE 1 TABLET (200 MG TOTAL) BY MOUTH 2 TIMES A DAY. 180 tablet 1   • cholecalciferol (VITAMIN D3) 400 units tablet Take 2,000 Units by mouth daily     • hydrOXYzine HCL (ATARAX) 25 mg tablet Take 1 tablet (25 mg total) by mouth daily at bedtime as needed for anxiety (insomnia) 30 tablet 0   • levonorgestrel (MIRENA) 20 MCG/24HR IUD 1 each by Intrauterine route     • meclizine (ANTIVERT) 12.5 MG tablet Take 1 tablet (12.5 mg total) by mouth 3 (three) times a day as needed for dizziness 30 tablet 0   • Qulipta 60 MG TABS Take 1 tablet by mouth daily     • rizatriptan (MAXALT) 10 mg tablet TAKE 1 TABLET AT MIGRAINE ONSET. MAY REPEAT IN 1HR.  MAX 2 TABS/DAY, 2 DAYS/WEEK     • Calcium Carbonate-Vitamin D 600-400 MG-UNIT per tablet Take 1 tablet by mouth daily     • eletriptan (Relpax) 40 MG tablet Take 1 tablet (40 mg total) by mouth daily as needed for migraine 30 tablet 0   • Magnesium 400 MG TABS Take 400 mg by mouth     • methocarbamol (ROBAXIN) 500 mg tablet Take 1 tablet (500 mg total) by mouth 4 (four) times a day 30 tablet 0   • Multiple Vitamins-Minerals (MULTIVITAMIN ADULT PO) Take 1 tablet by mouth daily     • naproxen (Naprosyn) 500 mg tablet Take 1 tablet (500 mg total) by mouth 2 (two) times a day with meals 30 tablet 0     No current facility-administered medications for this visit. Allergies: Allergies   Allergen Reactions   • Tramadol Hives      Physical Exam:     /88 (BP Location: Left arm, Patient Position: Sitting, Cuff Size: Adult)   Pulse 86   Temp 97.9 °F (36.6 °C) (Temporal)   Resp 16   Ht 5' 4.96" (1.65 m)   Wt 71.7 kg (158 lb)   SpO2 98%   BMI 26.32 kg/m²     Physical Exam  Vitals and nursing note reviewed. Constitutional:       General: She is not in acute distress. Appearance: Normal appearance. She is well-developed and well-groomed. She is not ill-appearing. HENT:      Head: Normocephalic. Right Ear: Tympanic membrane, ear canal and external ear normal.      Left Ear: Tympanic membrane, ear canal and external ear normal.      Nose: Nose normal.      Mouth/Throat:      Mouth: Mucous membranes are moist.      Pharynx: Oropharynx is clear. Eyes:      Conjunctiva/sclera: Conjunctivae normal.      Pupils: Pupils are equal, round, and reactive to light. Neck:      Thyroid: No thyromegaly. Vascular: No carotid bruit. Cardiovascular:      Rate and Rhythm: Normal rate and regular rhythm. Pulses:           Posterior tibial pulses are 2+ on the right side and 2+ on the left side. Heart sounds: Normal heart sounds. Pulmonary:      Effort: Pulmonary effort is normal. No respiratory distress. Breath sounds: Normal breath sounds and air entry. Abdominal:      General: Bowel sounds are normal.      Palpations: Abdomen is soft. Tenderness: There is no abdominal tenderness. Musculoskeletal:      Right lower leg: No edema. Left lower leg: No edema. Lymphadenopathy:      Cervical: No cervical adenopathy. Skin:     General: Skin is warm and dry.    Neurological:      General: No focal deficit present. Mental Status: She is alert and oriented to person, place, and time. Psychiatric:         Attention and Perception: Attention normal.         Mood and Affect: Mood normal.         Behavior: Behavior normal.         Thought Content:  Thought content normal.         Judgment: Judgment normal.          GREG Vincent  [address]

## 2023-08-17 ENCOUNTER — APPOINTMENT (OUTPATIENT)
Age: 48
End: 2023-08-17
Payer: COMMERCIAL

## 2023-08-17 PROBLEM — G43.909 MIGRAINE: Status: ACTIVE | Noted: 2023-08-17

## 2023-08-17 NOTE — ASSESSMENT & PLAN NOTE
Reports relatively stable. Denies depression. No SI/HI. Compliant with Wellbutrin. Occasional anxiety - controlled with Hydroxyzine.

## 2023-08-17 NOTE — ASSESSMENT & PLAN NOTE
History of - recurrent flares. Reports PT - vestibular therapy made symptoms worse.   Refer today to ENT for further evaluation/possible treatments receommendations

## 2023-08-22 ENCOUNTER — APPOINTMENT (OUTPATIENT)
Age: 48
End: 2023-08-22
Payer: COMMERCIAL

## 2023-08-24 ENCOUNTER — APPOINTMENT (OUTPATIENT)
Age: 48
End: 2023-08-24
Payer: COMMERCIAL

## 2023-08-29 ENCOUNTER — APPOINTMENT (OUTPATIENT)
Age: 48
End: 2023-08-29
Payer: COMMERCIAL

## 2023-08-31 ENCOUNTER — APPOINTMENT (OUTPATIENT)
Age: 48
End: 2023-08-31
Payer: COMMERCIAL

## 2023-09-13 ENCOUNTER — TELEPHONE (OUTPATIENT)
Dept: BARIATRICS | Facility: CLINIC | Age: 48
End: 2023-09-13

## 2024-02-14 ENCOUNTER — OFFICE VISIT (OUTPATIENT)
Dept: FAMILY MEDICINE CLINIC | Facility: CLINIC | Age: 49
End: 2024-02-14
Payer: COMMERCIAL

## 2024-02-14 VITALS
HEIGHT: 65 IN | BODY MASS INDEX: 26.96 KG/M2 | OXYGEN SATURATION: 98 % | TEMPERATURE: 98.2 F | WEIGHT: 161.8 LBS | HEART RATE: 81 BPM | SYSTOLIC BLOOD PRESSURE: 124 MMHG | DIASTOLIC BLOOD PRESSURE: 78 MMHG

## 2024-02-14 DIAGNOSIS — F41.8 DEPRESSION WITH ANXIETY: Primary | ICD-10-CM

## 2024-02-14 PROCEDURE — 99213 OFFICE O/P EST LOW 20 MIN: CPT | Performed by: FAMILY MEDICINE

## 2024-02-14 RX ORDER — ALPRAZOLAM 0.5 MG/1
0.5 TABLET ORAL
Qty: 30 TABLET | Refills: 0 | Status: SHIPPED | OUTPATIENT
Start: 2024-02-14

## 2024-02-14 RX ORDER — VENLAFAXINE HYDROCHLORIDE 37.5 MG/1
37.5 CAPSULE, EXTENDED RELEASE ORAL DAILY
Qty: 30 CAPSULE | Refills: 2 | Status: SHIPPED | OUTPATIENT
Start: 2024-02-14

## 2024-02-14 RX ORDER — BUPROPION HYDROCHLORIDE 300 MG/1
300 TABLET ORAL EVERY MORNING
Qty: 30 TABLET | Refills: 5 | Status: SHIPPED | OUTPATIENT
Start: 2024-02-14 | End: 2024-08-12

## 2024-02-14 NOTE — PROGRESS NOTES
Name: Ewa Salas      : 1975      MRN: [id number]  Encounter Provider: Osman Molina DO  Encounter Date: 2024   Encounter department: [facility]    Assessment & Plan     1. Depression with anxiety  -     ALPRAZolam (XANAX) 0.5 mg tablet; Take 1 tablet (0.5 mg total) by mouth daily at bedtime as needed for anxiety  -     venlafaxine (EFFEXOR-XR) 37.5 mg 24 hr capsule; Take 1 capsule (37.5 mg total) by mouth daily  -     buPROPion (WELLBUTRIN XL) 300 mg 24 hr tablet; Take 1 tablet (300 mg total) by mouth every morning     Patient having increased symptoms from anxiety and depression she does note that taking her Wellbutrin twice daily has been somewhat difficult and she has occasionally missed evening doses.  Will switch her to Wellbutrin  mg once a day.  Will start venlafaxine 37.5 mg daily and provide prescription for alprazolam 0.5 mg that she can take at at bedtime as needed for breakthrough anxiety symptoms.  She will also be taking advantage of the employee assistance program through work for counseling.    Subjective      Patient was seen for chief complaint of anxiety and depression.  She has been treated with Wellbutrin  twice daily for a number of years and generally has had a good response but has seen an increase in conflict with her teenage son recently and is having more breakthrough anxious and depressive symptoms.  She does take hydroxyzine but finds that it over sedates her and she has difficulty waking up in the morning when she takes it.      Review of Systems   Psychiatric/Behavioral:  Positive for dysphoric mood. The patient is nervous/anxious.        Current Outpatient Medications on File Prior to Visit   Medication Sig   • cholecalciferol (VITAMIN D3) 400 units tablet Take 2,000 Units by mouth daily   • hydrOXYzine HCL (ATARAX) 25 mg tablet Take 1 tablet (25 mg total) by mouth daily at bedtime as needed for anxiety (insomnia)   •  "levonorgestrel (MIRENA) 20 MCG/24HR IUD 1 each by Intrauterine route   • Qulipta 60 MG TABS Take 1 tablet by mouth daily   • rizatriptan (MAXALT) 10 mg tablet TAKE 1 TABLET AT MIGRAINE ONSET. MAY REPEAT IN 1HR. MAX 2 TABS/DAY, 2 DAYS/WEEK   • [DISCONTINUED] ALPRAZolam (XANAX) 0.25 mg tablet Take 1 tablet (0.25 mg total) by mouth if needed for anxiety   • [DISCONTINUED] buPROPion (WELLBUTRIN SR) 200 MG 12 hr tablet TAKE 1 TABLET (200 MG TOTAL) BY MOUTH 2 TIMES A DAY.   • Calcium Carbonate-Vitamin D 600-400 MG-UNIT per tablet Take 1 tablet by mouth daily   • eletriptan (Relpax) 40 MG tablet Take 1 tablet (40 mg total) by mouth daily as needed for migraine   • Magnesium 400 MG TABS Take 400 mg by mouth   • meclizine (ANTIVERT) 12.5 MG tablet Take 1 tablet (12.5 mg total) by mouth 3 (three) times a day as needed for dizziness   • methocarbamol (ROBAXIN) 500 mg tablet Take 1 tablet (500 mg total) by mouth 4 (four) times a day   • Multiple Vitamins-Minerals (MULTIVITAMIN ADULT PO) Take 1 tablet by mouth daily   • naproxen (Naprosyn) 500 mg tablet Take 1 tablet (500 mg total) by mouth 2 (two) times a day with meals       Objective     /78 (BP Location: Left arm, Patient Position: Sitting, Cuff Size: Large)   Pulse 81   Temp 98.2 °F (36.8 °C) (Temporal)   Ht 5' 4.96\" (1.65 m)   Wt 73.4 kg (161 lb 12.8 oz)   SpO2 98%   BMI 26.96 kg/m²     Physical Exam  Osman Molina, DO    "

## 2024-02-27 ENCOUNTER — PATIENT MESSAGE (OUTPATIENT)
Dept: FAMILY MEDICINE CLINIC | Facility: CLINIC | Age: 49
End: 2024-02-27

## 2024-04-06 DIAGNOSIS — F41.8 DEPRESSION WITH ANXIETY: ICD-10-CM

## 2024-04-07 RX ORDER — BUPROPION HYDROCHLORIDE 300 MG/1
300 TABLET ORAL EVERY MORNING
Qty: 90 TABLET | Refills: 2 | Status: SHIPPED | OUTPATIENT
Start: 2024-04-07 | End: 2024-10-04

## 2024-04-07 RX ORDER — VENLAFAXINE HYDROCHLORIDE 37.5 MG/1
37.5 CAPSULE, EXTENDED RELEASE ORAL DAILY
Qty: 90 CAPSULE | Refills: 1 | Status: SHIPPED | OUTPATIENT
Start: 2024-04-07

## 2024-05-07 DIAGNOSIS — F41.8 DEPRESSION WITH ANXIETY: Primary | ICD-10-CM

## 2024-05-07 RX ORDER — ALPRAZOLAM 0.25 MG/1
0.25 TABLET ORAL
Qty: 15 TABLET | Refills: 0 | Status: SHIPPED | OUTPATIENT
Start: 2024-05-07 | End: 2024-05-13 | Stop reason: SDUPTHER

## 2024-05-13 DIAGNOSIS — F41.8 DEPRESSION WITH ANXIETY: ICD-10-CM

## 2024-05-13 RX ORDER — ALPRAZOLAM 0.5 MG/1
0.5 TABLET ORAL
Qty: 30 TABLET | Refills: 1 | Status: SHIPPED | OUTPATIENT
Start: 2024-05-13

## 2024-10-07 DIAGNOSIS — F41.8 DEPRESSION WITH ANXIETY: ICD-10-CM

## 2024-10-08 RX ORDER — VENLAFAXINE HYDROCHLORIDE 37.5 MG/1
37.5 CAPSULE, EXTENDED RELEASE ORAL DAILY
Qty: 90 CAPSULE | Refills: 1 | OUTPATIENT
Start: 2024-10-08

## 2024-10-09 NOTE — TELEPHONE ENCOUNTER
2nd Attempt:    Lvm asking pt to call back to schedule    Last ov:2/14/2024    Letter and [employer] message sent

## 2024-11-04 ENCOUNTER — OFFICE VISIT (OUTPATIENT)
Dept: FAMILY MEDICINE CLINIC | Facility: CLINIC | Age: 49
End: 2024-11-04
Payer: COMMERCIAL

## 2024-11-04 VITALS
BODY MASS INDEX: 24.16 KG/M2 | TEMPERATURE: 98.6 F | OXYGEN SATURATION: 99 % | DIASTOLIC BLOOD PRESSURE: 80 MMHG | SYSTOLIC BLOOD PRESSURE: 102 MMHG | WEIGHT: 145 LBS | HEART RATE: 81 BPM

## 2024-11-04 DIAGNOSIS — F41.8 DEPRESSION WITH ANXIETY: Primary | ICD-10-CM

## 2024-11-04 PROCEDURE — 99213 OFFICE O/P EST LOW 20 MIN: CPT | Performed by: FAMILY MEDICINE

## 2024-11-04 RX ORDER — VENLAFAXINE HYDROCHLORIDE 37.5 MG/1
37.5 CAPSULE, EXTENDED RELEASE ORAL DAILY
Qty: 90 CAPSULE | Refills: 1 | Status: SHIPPED | OUTPATIENT
Start: 2024-11-04

## 2024-11-04 NOTE — ASSESSMENT & PLAN NOTE
Patient presents to discuss her medications today.  Patient is currently been taking bupropion  mg daily along with venlafaxine 37.5 mg daily.  2 weeks ago, she ran out of her venlafaxine and discontinued med.  Since that time, she has been experiencing nausea and increased dizziness.  Recommend restarting venlafaxine 37.5.  Call further problems    Orders:    venlafaxine (EFFEXOR-XR) 37.5 mg 24 hr capsule; Take 1 capsule (37.5 mg total) by mouth daily

## 2024-11-04 NOTE — PROGRESS NOTES
Ambulatory Visit  Name: Ewa Salas      : 1975      MRN: [id number]  Encounter Provider: Carlos Riggins DO  Encounter Date: 2024   Encounter department: [facility]    Assessment & Plan  Depression with anxiety  Patient presents to discuss her medications today.  Patient is currently been taking bupropion  mg daily along with venlafaxine 37.5 mg daily.  2 weeks ago, she ran out of her venlafaxine and discontinued med.  Since that time, she has been experiencing nausea and increased dizziness.  Recommend restarting venlafaxine 37.5.  Call further problems    Orders:    venlafaxine (EFFEXOR-XR) 37.5 mg 24 hr capsule; Take 1 capsule (37.5 mg total) by mouth daily         History of Present Illness     Patient presents to discuss her medications today.  Patient is currently been taking bupropion  mg daily along with venlafaxine 37.5 mg daily.  2 weeks ago, she ran out of her venlafaxine and discontinued med.  Since that time, she has been experiencing nausea and increased dizziness.      Review of Systems   Gastrointestinal:  Positive for nausea.   Neurological:  Positive for dizziness.     Past Medical History:   Diagnosis Date    Allergic     Anxiety     Depression     Headache(784.0)      Past Surgical History:   Procedure Laterality Date     SECTION       Family History   Problem Relation Age of Onset    COPD Mother     Coronary artery disease Mother     Diabetes Father     Stroke Father      Social History     Tobacco Use    Smoking status: Never    Smokeless tobacco: Never   Vaping Use    Vaping status: Never Used   Substance and Sexual Activity    Alcohol use: Yes     Alcohol/week: 2.0 standard drinks of alcohol     Types: 1 Glasses of wine, 1 Cans of beer per week     Comment: socially    Drug use: Never    Sexual activity: Yes     Partners: Male     Birth control/protection: I.U.D.     Current Outpatient Medications on File Prior to Visit    Medication Sig    ALPRAZolam (XANAX) 0.5 mg tablet Take 1 tablet (0.5 mg total) by mouth daily at bedtime as needed for anxiety for up to 30 doses    cholecalciferol (VITAMIN D3) 400 units tablet Take 2,000 Units by mouth daily    hydrOXYzine HCL (ATARAX) 25 mg tablet Take 1 tablet (25 mg total) by mouth daily at bedtime as needed for anxiety (insomnia)    levonorgestrel (MIRENA) 20 MCG/24HR IUD 1 each by Intrauterine route    Qulipta 60 MG TABS Take 1 tablet by mouth daily    rizatriptan (MAXALT) 10 mg tablet TAKE 1 TABLET AT MIGRAINE ONSET. MAY REPEAT IN 1HR. MAX 2 TABS/DAY, 2 DAYS/WEEK    buPROPion (WELLBUTRIN XL) 300 mg 24 hr tablet TAKE 1 TABLET (300 MG TOTAL) BY MOUTH EVERY MORNING.    Calcium Carbonate-Vitamin D 600-400 MG-UNIT per tablet Take 1 tablet by mouth daily    eletriptan (Relpax) 40 MG tablet Take 1 tablet (40 mg total) by mouth daily as needed for migraine    Magnesium 400 MG TABS Take 400 mg by mouth    meclizine (ANTIVERT) 12.5 MG tablet Take 1 tablet (12.5 mg total) by mouth 3 (three) times a day as needed for dizziness    methocarbamol (ROBAXIN) 500 mg tablet Take 1 tablet (500 mg total) by mouth 4 (four) times a day    Multiple Vitamins-Minerals (MULTIVITAMIN ADULT PO) Take 1 tablet by mouth daily    naproxen (Naprosyn) 500 mg tablet Take 1 tablet (500 mg total) by mouth 2 (two) times a day with meals    venlafaxine (EFFEXOR-XR) 37.5 mg 24 hr capsule TAKE 1 CAPSULE BY MOUTH EVERY DAY (Patient not taking: Reported on 11/4/2024)     Allergies   Allergen Reactions    Tramadol Hives     Immunization History   Administered Date(s) Administered    COVID-19 MODERNA VACC 0.5 ML IM 08/30/2021, 09/27/2021    INFLUENZA 01/01/2007, 11/01/2013, 10/17/2014    Influenza, injectable, quadrivalent, preservative free 0.5 mL 10/16/2019, 11/05/2020    Tdap 11/10/2020     Objective     /80 (BP Location: Left arm, Patient Position: Sitting, Cuff Size: Standard)   Pulse 81   Temp 98.6 °F (37 °C) (Temporal)    Wt 65.8 kg (145 lb)   SpO2 99%   BMI 24.16 kg/m²     Physical Exam  Cardiovascular:      Rate and Rhythm: Normal rate and regular rhythm.      Heart sounds: Normal heart sounds.      Comments: Carotids: no bruits  Ext: no edema  Pulmonary:      Effort: Pulmonary effort is normal. No respiratory distress.      Breath sounds: No wheezing or rales.   Psychiatric:         Behavior: Behavior normal.         Thought Content: Thought content normal.

## 2025-01-04 DIAGNOSIS — F41.8 DEPRESSION WITH ANXIETY: ICD-10-CM

## 2025-01-05 RX ORDER — BUPROPION HYDROCHLORIDE 300 MG/1
300 TABLET ORAL EVERY MORNING
Qty: 90 TABLET | Refills: 1 | Status: SHIPPED | OUTPATIENT
Start: 2025-01-05 | End: 2025-07-04

## 2025-02-03 DIAGNOSIS — F41.8 DEPRESSION WITH ANXIETY: ICD-10-CM

## 2025-02-04 RX ORDER — VENLAFAXINE HYDROCHLORIDE 37.5 MG/1
37.5 CAPSULE, EXTENDED RELEASE ORAL DAILY
Qty: 90 CAPSULE | Refills: 1 | Status: SHIPPED | OUTPATIENT
Start: 2025-02-04

## 2025-02-18 DIAGNOSIS — F41.8 DEPRESSION WITH ANXIETY: ICD-10-CM

## 2025-02-19 RX ORDER — ALPRAZOLAM 0.5 MG
0.5 TABLET ORAL
Qty: 30 TABLET | OUTPATIENT
Start: 2025-02-19

## 2025-02-19 NOTE — TELEPHONE ENCOUNTER
Patient last seen in November-mental health medication changes at that time.  No follow up since.  I have not seen patient since 2023.  She will need an appointment/follow-up for Xanax refill

## 2025-02-20 DIAGNOSIS — F41.8 DEPRESSION WITH ANXIETY: ICD-10-CM

## 2025-02-20 RX ORDER — ALPRAZOLAM 0.5 MG
TABLET ORAL
Qty: 15 TABLET | Refills: 0 | Status: SHIPPED | OUTPATIENT
Start: 2025-02-20

## 2025-05-27 ENCOUNTER — TELEPHONE (OUTPATIENT)
Dept: FAMILY MEDICINE CLINIC | Facility: CLINIC | Age: 50
End: 2025-05-27

## 2025-05-27 NOTE — TELEPHONE ENCOUNTER
Called pt and lvm in regards to appointment on 5/28/25 with Dr. Riggins. Requested that the pt brings in a physical copy of insurance card and photo ID to appointment.

## 2025-05-28 ENCOUNTER — OFFICE VISIT (OUTPATIENT)
Dept: FAMILY MEDICINE CLINIC | Facility: CLINIC | Age: 50
End: 2025-05-28
Payer: COMMERCIAL

## 2025-05-28 VITALS
WEIGHT: 148.2 LBS | DIASTOLIC BLOOD PRESSURE: 90 MMHG | BODY MASS INDEX: 24.69 KG/M2 | SYSTOLIC BLOOD PRESSURE: 136 MMHG | TEMPERATURE: 97.2 F | HEIGHT: 65 IN | HEART RATE: 81 BPM | OXYGEN SATURATION: 95 % | RESPIRATION RATE: 16 BRPM

## 2025-05-28 DIAGNOSIS — R39.9 UTI SYMPTOMS: ICD-10-CM

## 2025-05-28 DIAGNOSIS — N39.0 URINARY TRACT INFECTION WITHOUT HEMATURIA, SITE UNSPECIFIED: ICD-10-CM

## 2025-05-28 DIAGNOSIS — F41.8 DEPRESSION WITH ANXIETY: Primary | ICD-10-CM

## 2025-05-28 DIAGNOSIS — R30.0 DYSURIA: ICD-10-CM

## 2025-05-28 LAB
SL AMB  POCT GLUCOSE, UA: ABNORMAL
SL AMB LEUKOCYTE ESTERASE,UA: ABNORMAL
SL AMB POCT BILIRUBIN,UA: ABNORMAL
SL AMB POCT BLOOD,UA: ABNORMAL
SL AMB POCT CLARITY,UA: CLEAR
SL AMB POCT COLOR,UA: YELLOW
SL AMB POCT KETONES,UA: ABNORMAL
SL AMB POCT NITRITE,UA: ABNORMAL
SL AMB POCT PH,UA: 5.5
SL AMB POCT SPECIFIC GRAVITY,UA: 1.01
SL AMB POCT URINE PROTEIN: ABNORMAL
SL AMB POCT UROBILINOGEN: 0.2

## 2025-05-28 PROCEDURE — 87186 SC STD MICRODIL/AGAR DIL: CPT | Performed by: FAMILY MEDICINE

## 2025-05-28 PROCEDURE — 99214 OFFICE O/P EST MOD 30 MIN: CPT | Performed by: FAMILY MEDICINE

## 2025-05-28 PROCEDURE — 87086 URINE CULTURE/COLONY COUNT: CPT | Performed by: FAMILY MEDICINE

## 2025-05-28 PROCEDURE — 81002 URINALYSIS NONAUTO W/O SCOPE: CPT | Performed by: FAMILY MEDICINE

## 2025-05-28 PROCEDURE — 87077 CULTURE AEROBIC IDENTIFY: CPT | Performed by: FAMILY MEDICINE

## 2025-05-28 RX ORDER — SULFAMETHOXAZOLE AND TRIMETHOPRIM 800; 160 MG/1; MG/1
1 TABLET ORAL EVERY 12 HOURS SCHEDULED
Qty: 14 TABLET | Refills: 0 | Status: SHIPPED | OUTPATIENT
Start: 2025-05-28 | End: 2025-06-04

## 2025-05-28 RX ORDER — BUPROPION HYDROCHLORIDE 300 MG/1
300 TABLET ORAL EVERY MORNING
Qty: 90 TABLET | Refills: 1 | Status: SHIPPED | OUTPATIENT
Start: 2025-05-28 | End: 2025-11-24

## 2025-05-28 RX ORDER — VENLAFAXINE HYDROCHLORIDE 37.5 MG/1
37.5 CAPSULE, EXTENDED RELEASE ORAL DAILY
Qty: 90 CAPSULE | Refills: 1 | Status: SHIPPED | OUTPATIENT
Start: 2025-05-28

## 2025-05-28 RX ORDER — ALPRAZOLAM 0.5 MG
TABLET ORAL
Qty: 15 TABLET | Refills: 0 | Status: SHIPPED | OUTPATIENT
Start: 2025-05-28

## 2025-05-28 NOTE — PROGRESS NOTES
Name: Ewa Salas      : 1975      MRN: [id number]  Encounter Provider: Carlos Riggins DO  Encounter Date: 2025   Encounter department: [facility]    Assessment & Plan  Depression with anxiety  Has been doing well on bupropion  and venlafaxine XR 37.5.  Patient also uses alprazolam  0.5 mg sparingly (usually when she travels).  Patient requesting refills today.  Sent    Orders:  •  ALPRAZolam (XANAX) 0.5 mg tablet; 0.5-1 tab PO BID PRN anxiety  •  buPROPion (WELLBUTRIN XL) 300 mg 24 hr tablet; Take 1 tablet (300 mg total) by mouth every morning  •  venlafaxine (EFFEXOR-XR) 37.5 mg 24 hr capsule; Take 1 capsule (37.5 mg total) by mouth daily    Dysuria  Patient with dysuria for the past few days.  She has been taking OTC Azo, but symptoms have persisted.  Urinalysis revealed trace leukocytes and trace blood.  Will give prescription for Bactrim.  Will also send out for urine culture.  Drink plenty of fluids.  Will call with results  Orders:  •  sulfamethoxazole-trimethoprim (BACTRIM DS) 800-160 mg per tablet; Take 1 tablet by mouth every 12 (twelve) hours for 7 days    Urinary tract infection without hematuria, site unspecified    Orders:  •  POCT urine dip    UTI symptoms    Orders:  •  Urine culture; Future           Negative Cologuard 2024  Current with breast cancer screening    Last tetanus booster 2020    Patient states she had routine labs ordered by chiropractor last year.  She will forward results      History of Present Illness     Patient presents with dysuria for the past few days.  Patient also would like refills on her anxiety medications.      Review of Systems   Respiratory: Negative.     Cardiovascular: Negative.    Gastrointestinal: Negative.    Genitourinary:  Positive for dysuria.     Past Medical History[1]  Past Surgical History[2]  Family History[3]  Social History[4]  Medications[5]  Allergies   Allergen Reactions   • Tramadol Hives  "    Immunization History   Administered Date(s) Administered   • COVID-19 MODERNA VACC 0.5 ML IM 2021, 2021   • INFLUENZA 2007, 2013, 10/17/2014, 2021, 2022   • Influenza, injectable, quadrivalent, preservative free 0.5 mL 10/16/2019, 2020   • Tdap 11/10/2020     Objective   /90 (BP Location: Left arm, Patient Position: Sitting, Cuff Size: Large)   Pulse 81   Temp (!) 97.2 °F (36.2 °C) (Temporal)   Resp 16   Ht 5' 4.96\" (1.65 m)   Wt 67.2 kg (148 lb 3.2 oz)   SpO2 95%   BMI 24.69 kg/m²     Physical Exam    Cardiovascular:      Rate and Rhythm: Normal rate and regular rhythm.      Heart sounds: Normal heart sounds.      Comments: Carotids: no bruits  Ext: no edema  Pulmonary:      Effort: Pulmonary effort is normal. No respiratory distress.      Breath sounds: No wheezing or rales.     Psychiatric:         Behavior: Behavior normal.         Thought Content: Thought content normal.                [1]  Past Medical History:  Diagnosis Date   • Allergic    • Anxiety    • Depression    • Headache(784.0)    [2]  Past Surgical History:  Procedure Laterality Date   •  SECTION     [3]  Family History  Problem Relation Name Age of Onset   • COPD Mother Edelmira Kemp    • Coronary artery disease Mother Edelmira Kemp    • Diabetes Father Marciano    • Stroke Father Marciano    [4]  Social History  Tobacco Use   • Smoking status: Never   • Smokeless tobacco: Never   Vaping Use   • Vaping status: Never Used   Substance and Sexual Activity   • Alcohol use: Yes     Alcohol/week: 2.0 standard drinks of alcohol     Types: 1 Glasses of wine, 1 Cans of beer per week     Comment: socially   • Drug use: Never   • Sexual activity: Yes     Partners: Male     Birth control/protection: I.U.D.   [5]  Current Outpatient Medications on File Prior to Visit   Medication Sig   • cholecalciferol (VITAMIN D3) 400 units tablet Take 2,000 Units by mouth in the morning.   • levonorgestrel " (MIRENA) 20 MCG/24HR IUD 1 each by Intrauterine route   • Qulipta 60 MG TABS Take 1 tablet by mouth in the morning.   • rizatriptan (MAXALT) 10 mg tablet    • [DISCONTINUED] ALPRAZolam (XANAX) 0.5 mg tablet 0.5-1 tab PO BID PRN anxiety   • [DISCONTINUED] buPROPion (WELLBUTRIN XL) 300 mg 24 hr tablet TAKE 1 TABLET (300 MG TOTAL) BY MOUTH EVERY MORNING.   • [DISCONTINUED] hydrOXYzine HCL (ATARAX) 25 mg tablet Take 1 tablet (25 mg total) by mouth daily at bedtime as needed for anxiety (insomnia)   • [DISCONTINUED] venlafaxine (EFFEXOR-XR) 37.5 mg 24 hr capsule TAKE 1 CAPSULE BY MOUTH EVERY DAY

## 2025-05-28 NOTE — ASSESSMENT & PLAN NOTE
Has been doing well on bupropion  and venlafaxine XR 37.5.  Patient also uses alprazolam  0.5 mg sparingly (usually when she travels).  Patient requesting refills today.  Sent    Orders:  •  ALPRAZolam (XANAX) 0.5 mg tablet; 0.5-1 tab PO BID PRN anxiety  •  buPROPion (WELLBUTRIN XL) 300 mg 24 hr tablet; Take 1 tablet (300 mg total) by mouth every morning  •  venlafaxine (EFFEXOR-XR) 37.5 mg 24 hr capsule; Take 1 capsule (37.5 mg total) by mouth daily

## 2025-05-30 ENCOUNTER — RESULTS FOLLOW-UP (OUTPATIENT)
Dept: FAMILY MEDICINE CLINIC | Facility: CLINIC | Age: 50
End: 2025-05-30

## 2025-05-31 LAB — BACTERIA UR CULT: ABNORMAL

## 2025-06-02 DIAGNOSIS — E66.3 OVERWEIGHT (BMI 25.0-29.9): Primary | ICD-10-CM

## 2025-06-02 RX ORDER — NALTREXONE HYDROCHLORIDE 50 MG/1
50 TABLET, FILM COATED ORAL DAILY
Qty: 30 TABLET | Refills: 2 | Status: SHIPPED | OUTPATIENT
Start: 2025-06-02

## 2025-06-04 DIAGNOSIS — N39.0 URINARY TRACT INFECTION WITHOUT HEMATURIA, SITE UNSPECIFIED: Primary | ICD-10-CM

## 2025-06-04 RX ORDER — NITROFURANTOIN 25; 75 MG/1; MG/1
100 CAPSULE ORAL 2 TIMES DAILY
Qty: 14 CAPSULE | Refills: 0 | Status: SHIPPED | OUTPATIENT
Start: 2025-06-04 | End: 2025-06-11

## 2025-06-04 NOTE — TELEPHONE ENCOUNTER
Patient called in and states that she is having urine frequency and urgency and is requesting another round of antibiotics or a different one. Please advise

## 2025-06-12 DIAGNOSIS — R39.9 UTI SYMPTOMS: Primary | ICD-10-CM

## 2025-06-14 ENCOUNTER — APPOINTMENT (OUTPATIENT)
Dept: LAB | Facility: CLINIC | Age: 50
End: 2025-06-14
Payer: COMMERCIAL

## 2025-06-14 DIAGNOSIS — R39.9 UTI SYMPTOMS: ICD-10-CM

## 2025-06-14 PROCEDURE — 87086 URINE CULTURE/COLONY COUNT: CPT

## 2025-06-15 LAB — BACTERIA UR CULT: NORMAL

## 2025-06-16 ENCOUNTER — RESULTS FOLLOW-UP (OUTPATIENT)
Dept: FAMILY MEDICINE CLINIC | Facility: CLINIC | Age: 50
End: 2025-06-16

## 2025-06-18 DIAGNOSIS — R30.0 DYSURIA: Primary | ICD-10-CM

## 2025-06-26 NOTE — PROGRESS NOTES
6/27/2025      Chief Complaint   Patient presents with   • Difficulty Urinating         Assessment and Plan    49 y.o. female managed by new patient       1. UTI symptoms  Assessment & Plan:    UTI sytmpoms   UA- no leukocytes-moderate blood noted   will send for micro  Last UTI on file was 5/28- burning, frequency but nothing coming out   Culture from 6/14 was negative after treatment   Still has some intermittent burning and pressure urgency feeling in the bladder  Reviewed bladder irritants adequate hydration  Plan to start cranberry over-the-counter supplement for UTI prevention     Orders:  -     POCT urine dip auto non-scope  -     Ambulatory Referral to Urology  -     Urinalysis with microscopic  2. OAB (overactive bladder)  Assessment & Plan:  Ongoing bladder pressure/urgency  No incontinence associated  UA-negative for leukocytes, moderate blood  Sending for micro  Plan to start with renal ultrasound  Will call with results  Reviewed bladder irritants and adequate hydration  Plan to trial oxybutynin 5 mg  Reviewed usage and side effects  Plan to follow-up in 3 months for med eval  Orders:  -     POCT urine dip auto non-scope  -     Ambulatory Referral to Urology  -     US kidney and bladder with pvr; Future; Expected date: 06/27/2025  -     oxybutynin (DITROPAN-XL) 5 mg 24 hr tablet; Take 1 tablet (5 mg total) by mouth daily  -     Urinalysis with microscopic          History of Present Illness  Ewa Salas is a 49 y.o. female here for evaluation of UTI symptoms.  Patient was having burning along with frequency but nothing coming out.  She did have a positive culture and was treated with antibiotics.  She then continued with symptoms.  Repeat urine culture was negative for infection.  She does continue with intermittent burning along with the bladder pressure/urgency feeling.  No blood in the urine noted.  No flank pain.  She feels that she adequately empties her bladder.  She mainly drinks water  "throughout the day.  She reports no history of nephrolithiasis or past issues with UTIs.         Review of Systems   Constitutional:  Negative for chills.   Gastrointestinal:  Negative for nausea and vomiting.   Genitourinary:  Positive for dysuria, frequency and urgency. Negative for flank pain and hematuria.                Vitals  Vitals:    06/27/25 1457   BP: 120/80   BP Location: Left arm   Patient Position: Sitting   Cuff Size: Standard   Pulse: 88   SpO2: 97%   Weight: 64.9 kg (143 lb)   Height: 5' 4.96\" (1.65 m)       Physical Exam  Constitutional:       Appearance: Normal appearance.   HENT:      Head: Normocephalic and atraumatic.   Pulmonary:      Effort: Pulmonary effort is normal.     Musculoskeletal:         General: Normal range of motion.      Cervical back: Normal range of motion.     Neurological:      General: No focal deficit present.      Mental Status: She is alert and oriented to person, place, and time. Mental status is at baseline.     Psychiatric:         Mood and Affect: Mood normal.         Behavior: Behavior normal.         Thought Content: Thought content normal.           Past History  Past Medical History[1]  Social History[2]  Tobacco Use History[3]  Family History[4]    The following portions of the patient's history were reviewed and updated as appropriate: allergies, current medications, past medical history, past social history, past surgical history and problem list.    Results  Recent Results (from the past hour)   POCT urine dip auto non-scope    Collection Time: 06/27/25  3:08 PM   Result Value Ref Range     COLOR,UA yellow     CLARITY,UA clear     SPECIFIC GRAVITY,UA 1.025      PH,UA 5.5     LEUKOCYTE ESTERASE,UA neg     NITRITE,UA neg     GLUCOSE, UA neg     KETONES,UA trace     BILIRUBIN,UA neg     BLOOD,UA mod     POCT URINE PROTEIN neg     SL AMB POCT UROBILINOGEN 0.2    ]  No results found for: \"PSA\"  Lab Results   Component Value Date    CALCIUM 9.6 07/21/2023    K 4.3 " 07/21/2023    CO2 23 07/21/2023     07/21/2023    BUN 12 07/21/2023    CREATININE 0.90 07/21/2023     Lab Results   Component Value Date    WBC 6.3 07/21/2023    HGB 14.1 07/21/2023    HCT 42.0 07/21/2023    MCV 90.9 07/21/2023     07/21/2023              [1]  Past Medical History:  Diagnosis Date   • Allergic    • Anxiety    • Depression    • Headache(784.0)    [2]  Social History  Socioeconomic History   • Marital status:    Tobacco Use   • Smoking status: Never   • Smokeless tobacco: Never   Vaping Use   • Vaping status: Never Used   Substance and Sexual Activity   • Alcohol use: Yes     Alcohol/week: 2.0 standard drinks of alcohol     Types: 1 Glasses of wine, 1 Cans of beer per week     Comment: socially   • Drug use: Never   • Sexual activity: Yes     Partners: Male     Birth control/protection: I.U.D.   [3]  Social History  Tobacco Use   Smoking Status Never   Smokeless Tobacco Never   [4]  Family History  Problem Relation Name Age of Onset   • COPD Mother Edelmira Kemp    • Coronary artery disease Mother Edelmira Kemp    • Diabetes Father Marciano    • Stroke Father Marciano

## 2025-06-27 ENCOUNTER — OFFICE VISIT (OUTPATIENT)
Dept: UROLOGY | Facility: MEDICAL CENTER | Age: 50
End: 2025-06-27
Payer: COMMERCIAL

## 2025-06-27 VITALS
WEIGHT: 143 LBS | DIASTOLIC BLOOD PRESSURE: 80 MMHG | OXYGEN SATURATION: 97 % | BODY MASS INDEX: 23.82 KG/M2 | HEART RATE: 88 BPM | HEIGHT: 65 IN | SYSTOLIC BLOOD PRESSURE: 120 MMHG

## 2025-06-27 DIAGNOSIS — R39.9 UTI SYMPTOMS: Primary | ICD-10-CM

## 2025-06-27 DIAGNOSIS — N32.81 OAB (OVERACTIVE BLADDER): ICD-10-CM

## 2025-06-27 LAB
BACTERIA UR QL AUTO: ABNORMAL /HPF
BILIRUB UR QL STRIP: NEGATIVE
CAOX CRY URNS QL MICRO: ABNORMAL /HPF
CLARITY UR: CLEAR
COLOR UR: YELLOW
GLUCOSE UR STRIP-MCNC: NEGATIVE MG/DL
HGB UR QL STRIP.AUTO: ABNORMAL
KETONES UR STRIP-MCNC: NEGATIVE MG/DL
LEUKOCYTE ESTERASE UR QL STRIP: NEGATIVE
MUCOUS THREADS UR QL AUTO: ABNORMAL
NITRITE UR QL STRIP: NEGATIVE
NON-SQ EPI CELLS URNS QL MICRO: ABNORMAL /HPF
PH UR STRIP.AUTO: 5.5 [PH]
PROT UR STRIP-MCNC: NEGATIVE MG/DL
RBC #/AREA URNS AUTO: ABNORMAL /HPF
SL AMB  POCT GLUCOSE, UA: ABNORMAL
SL AMB LEUKOCYTE ESTERASE,UA: ABNORMAL
SL AMB POCT BILIRUBIN,UA: ABNORMAL
SL AMB POCT BLOOD,UA: ABNORMAL
SL AMB POCT CLARITY,UA: CLEAR
SL AMB POCT COLOR,UA: YELLOW
SL AMB POCT KETONES,UA: ABNORMAL
SL AMB POCT NITRITE,UA: ABNORMAL
SL AMB POCT PH,UA: 5.5
SL AMB POCT SPECIFIC GRAVITY,UA: 1.02
SL AMB POCT URINE PROTEIN: ABNORMAL
SL AMB POCT UROBILINOGEN: 0.2
SP GR UR STRIP.AUTO: 1.02 (ref 1–1.03)
UROBILINOGEN UR STRIP-ACNC: <2 MG/DL
WBC #/AREA URNS AUTO: ABNORMAL /HPF

## 2025-06-27 PROCEDURE — 99203 OFFICE O/P NEW LOW 30 MIN: CPT

## 2025-06-27 PROCEDURE — 81003 URINALYSIS AUTO W/O SCOPE: CPT

## 2025-06-27 PROCEDURE — 81001 URINALYSIS AUTO W/SCOPE: CPT

## 2025-06-27 RX ORDER — OXYBUTYNIN CHLORIDE 5 MG/1
5 TABLET, EXTENDED RELEASE ORAL DAILY
Qty: 90 TABLET | Refills: 3 | Status: SHIPPED | OUTPATIENT
Start: 2025-06-27 | End: 2026-06-22

## 2025-06-27 NOTE — ASSESSMENT & PLAN NOTE
Ongoing bladder pressure/urgency  No incontinence associated  UA-negative for leukocytes, moderate blood  Sending for micro  Plan to start with renal ultrasound  Will call with results  Reviewed bladder irritants and adequate hydration  Plan to trial oxybutynin 5 mg  Reviewed usage and side effects  Plan to follow-up in 3 months for med eval

## 2025-06-27 NOTE — ASSESSMENT & PLAN NOTE
UTI sytmpoms   UA- no leukocytes-moderate blood noted   will send for micro  Last UTI on file was 5/28- burning, frequency but nothing coming out   Culture from 6/14 was negative after treatment   Still has some intermittent burning and pressure urgency feeling in the bladder  Reviewed bladder irritants adequate hydration  Plan to start cranberry over-the-counter supplement for UTI prevention

## 2025-07-02 ENCOUNTER — HOSPITAL ENCOUNTER (OUTPATIENT)
Dept: ULTRASOUND IMAGING | Facility: HOSPITAL | Age: 50
Discharge: HOME | End: 2025-07-02
Payer: COMMERCIAL

## 2025-07-02 DIAGNOSIS — N32.81 OAB (OVERACTIVE BLADDER): ICD-10-CM

## 2025-07-02 PROCEDURE — 76770 US EXAM ABDO BACK WALL COMP: CPT

## 2025-07-10 ENCOUNTER — TELEPHONE (OUTPATIENT)
Age: 50
End: 2025-07-10

## 2025-08-18 ENCOUNTER — OFFICE VISIT (OUTPATIENT)
Dept: FAMILY MEDICINE CLINIC | Facility: CLINIC | Age: 50
End: 2025-08-18
Payer: COMMERCIAL

## 2025-08-18 VITALS
WEIGHT: 144 LBS | HEART RATE: 89 BPM | BODY MASS INDEX: 23.99 KG/M2 | DIASTOLIC BLOOD PRESSURE: 78 MMHG | SYSTOLIC BLOOD PRESSURE: 116 MMHG | TEMPERATURE: 97.7 F | HEIGHT: 65 IN | OXYGEN SATURATION: 98 %

## 2025-08-18 DIAGNOSIS — Z13.220 SCREENING CHOLESTEROL LEVEL: ICD-10-CM

## 2025-08-18 DIAGNOSIS — E66.3 OVERWEIGHT: Primary | ICD-10-CM

## 2025-08-18 DIAGNOSIS — R79.9 ABNORMAL BLOOD CHEMISTRY: ICD-10-CM

## 2025-08-18 DIAGNOSIS — Z13.0 SCREENING FOR DEFICIENCY ANEMIA: ICD-10-CM

## 2025-08-18 DIAGNOSIS — Z01.419 ENCOUNTER FOR GYNECOLOGICAL EXAMINATION: ICD-10-CM

## 2025-08-18 DIAGNOSIS — F41.8 DEPRESSION WITH ANXIETY: ICD-10-CM

## 2025-08-18 DIAGNOSIS — Z13.29 SCREENING FOR THYROID DISORDER: ICD-10-CM

## 2025-08-18 PROCEDURE — 99213 OFFICE O/P EST LOW 20 MIN: CPT | Performed by: FAMILY MEDICINE

## 2025-08-23 DIAGNOSIS — R73.09 ABNORMAL BLOOD SUGAR: Primary | ICD-10-CM

## 2025-08-23 LAB
ALBUMIN SERPL-MCNC: 4.6 G/DL (ref 3.6–5.1)
ALBUMIN/GLOB SERPL: 1.8 (CALC) (ref 1–2.5)
ALP SERPL-CCNC: 51 U/L (ref 31–125)
ALT SERPL-CCNC: 12 U/L (ref 6–29)
AST SERPL-CCNC: 14 U/L (ref 10–35)
BASOPHILS # BLD AUTO: 50 CELLS/UL (ref 0–200)
BASOPHILS NFR BLD AUTO: 1.1 %
BILIRUB SERPL-MCNC: 0.6 MG/DL (ref 0.2–1.2)
BUN SERPL-MCNC: 10 MG/DL (ref 7–25)
BUN/CREAT SERPL: NORMAL (CALC) (ref 6–22)
CALCIUM SERPL-MCNC: 9.1 MG/DL (ref 8.6–10.2)
CHLORIDE SERPL-SCNC: 104 MMOL/L (ref 98–110)
CHOLEST SERPL-MCNC: 193 MG/DL
CHOLEST/HDLC SERPL: 3 (CALC)
CO2 SERPL-SCNC: 25 MMOL/L (ref 20–32)
CREAT SERPL-MCNC: 0.73 MG/DL (ref 0.5–0.99)
EOSINOPHIL # BLD AUTO: 108 CELLS/UL (ref 15–500)
EOSINOPHIL NFR BLD AUTO: 2.4 %
ERYTHROCYTE [DISTWIDTH] IN BLOOD BY AUTOMATED COUNT: 12.4 % (ref 11–15)
GFR/BSA.PRED SERPLBLD CYS-BASED-ARV: 101 ML/MIN/1.73M2
GLOBULIN SER CALC-MCNC: 2.5 G/DL (CALC) (ref 1.9–3.7)
GLUCOSE SERPL-MCNC: 95 MG/DL (ref 65–99)
HBA1C MFR BLD: 5.1 %
HCT VFR BLD AUTO: 40.1 % (ref 35–45)
HDLC SERPL-MCNC: 65 MG/DL
HGB BLD-MCNC: 13.4 G/DL (ref 11.7–15.5)
LDLC SERPL CALC-MCNC: 112 MG/DL (CALC)
LYMPHOCYTES # BLD AUTO: 1544 CELLS/UL (ref 850–3900)
LYMPHOCYTES NFR BLD AUTO: 34.3 %
MCH RBC QN AUTO: 32.3 PG (ref 27–33)
MCHC RBC AUTO-ENTMCNC: 33.4 G/DL (ref 32–36)
MCV RBC AUTO: 96.6 FL (ref 80–100)
MONOCYTES # BLD AUTO: 396 CELLS/UL (ref 200–950)
MONOCYTES NFR BLD AUTO: 8.8 %
NEUTROPHILS # BLD AUTO: 2403 CELLS/UL (ref 1500–7800)
NEUTROPHILS NFR BLD AUTO: 53.4 %
NONHDLC SERPL-MCNC: 128 MG/DL (CALC)
PLATELET # BLD AUTO: 207 THOUSAND/UL (ref 140–400)
PMV BLD REES-ECKER: 10.7 FL (ref 7.5–12.5)
POTASSIUM SERPL-SCNC: 4.1 MMOL/L (ref 3.5–5.3)
PROT SERPL-MCNC: 7.1 G/DL (ref 6.1–8.1)
RBC # BLD AUTO: 4.15 MILLION/UL (ref 3.8–5.1)
SODIUM SERPL-SCNC: 137 MMOL/L (ref 135–146)
TRIGL SERPL-MCNC: 75 MG/DL
TSH SERPL-ACNC: 1.14 MIU/L
WBC # BLD AUTO: 4.5 THOUSAND/UL (ref 3.8–10.8)